# Patient Record
Sex: FEMALE | Race: WHITE
[De-identification: names, ages, dates, MRNs, and addresses within clinical notes are randomized per-mention and may not be internally consistent; named-entity substitution may affect disease eponyms.]

---

## 2017-03-22 ENCOUNTER — HOSPITAL ENCOUNTER (OUTPATIENT)
Dept: HOSPITAL 47 - RADMRIMAIN | Age: 82
Discharge: HOME | End: 2017-03-22
Payer: MEDICARE

## 2017-03-22 DIAGNOSIS — M48.06: Primary | ICD-10-CM

## 2017-03-22 DIAGNOSIS — M51.36: ICD-10-CM

## 2017-03-22 DIAGNOSIS — M46.96: ICD-10-CM

## 2017-03-22 PROCEDURE — 72148 MRI LUMBAR SPINE W/O DYE: CPT

## 2017-03-23 NOTE — MR
EXAMINATION TYPE: MR lumbar spine wo con

 

DATE OF EXAM: 3/22/2017 7:04 PM

 

COMPARISON: Prior lumbar spine MRI 10 July 2015

 

HISTORY: Low back pain, stenosis, radiculopathy

 

TECHNIQUE: 

Multiplanar, multisequence images of the lumbar spine were acquired.

 

L1-L2: Mild facet arthropathy, no significant central stenosis or foraminal encroachment. Broad-based
 disc bulge causes minimal anterior mass effect on the thecal sac

 

L2-L3: Circumferential posterior extension of endplate disc complex results in some left-sided forami
nal encroachment greater than right, there is mild anterior mass effect on the thecal sac, minimal ce
ntral stenosis. Facet arthropathy with hypertrophy of the ligamentum flavum encroaches on the lateral
 recesses

 

L3-L4: Broad-based posterior disc bulge causes mild anterior mass effect on the thecal sac. Facet art
hropathy with hypertrophy of the ligamentum flavum causes lateral recess stenosis, there is trefoil a
ppearance of the thecal sac, circumferential extension encroaches somewhat on the foramina left great
er than right.

 

L4-L5: Facet arthropathy with hypertrophy of the ligamentum flavum causes a trefoil appearance of the
 thecal sac, small central posterior disc herniation combines with the listhesis to cause moderate sp
inal stenosis similar to prior exam

 

L5-S1: Facet arthropathy with hypertrophy of ligamentum flavum encroaches upon the lateral recess gre
ater on the right, circumferential posterior disc bulge extends laterally and encroaches somewhat on 
the neural foramina greater on the right as on prior, there is mild anterior mass effect on the theca
l sac and possibly on the proximal S1 nerve roots

 

Lumbar segments are intact.  No paraspinal masses are identified.  Conus medullaris has a normal appe
arance. Anterolisthesis grade 1 L4-5 is again noted. There is multilevel spondylosis. Loss of disc he
ight and signal at the intervertebral levels with endplate discogenic marrow signal change is again s
een. There is a spinal curvature. The conus is at T12. Superior endplate L2 shows a Schmorl's node to
 the right of midline as on prior.

 

IMPRESSION:

Spinal stenosis most significant at L4-5, multilevel degenerative disc disease and facet arthropathy,
 foraminal encroachment as described. Findings are similar to prior exam.

## 2017-04-13 ENCOUNTER — HOSPITAL ENCOUNTER (OUTPATIENT)
Dept: HOSPITAL 47 - RADCTMAIN | Age: 82
Discharge: HOME | End: 2017-04-13
Payer: MEDICARE

## 2017-04-13 DIAGNOSIS — R63.4: ICD-10-CM

## 2017-04-13 DIAGNOSIS — Z98.890: ICD-10-CM

## 2017-04-13 DIAGNOSIS — R10.84: Primary | ICD-10-CM

## 2017-04-13 LAB
ANION GAP SERPL CALC-SCNC: 8 MMOL/L
BUN SERPL-SCNC: 15 MG/DL (ref 7–17)
CALCIUM SPEC-MCNC: 9.5 MG/DL (ref 8.4–10.2)
CHLORIDE SERPL-SCNC: 101 MMOL/L (ref 98–107)
CO2 SERPL-SCNC: 27 MMOL/L (ref 22–30)
GLUCOSE SERPL-MCNC: 105 MG/DL (ref 74–99)
NON-AFRICAN AMERICAN GFR(MDRD): 47
POTASSIUM SERPL-SCNC: 5.4 MMOL/L (ref 3.5–5.1)
SODIUM SERPL-SCNC: 136 MMOL/L (ref 137–145)

## 2017-04-13 PROCEDURE — 80048 BASIC METABOLIC PNL TOTAL CA: CPT

## 2017-04-13 PROCEDURE — 74177 CT ABD & PELVIS W/CONTRAST: CPT

## 2017-04-13 PROCEDURE — 36415 COLL VENOUS BLD VENIPUNCTURE: CPT

## 2017-04-13 NOTE — CT
EXAMINATION TYPE: CT abdomen pelvis w con

 

DATE OF EXAM: 4/13/2017 6:32 PM

 

COMPARISON: 3/27/2015

 

HISTORY: Patient complains of generalized abdominal pain and right sided abdominal mass.

 

CT DLP: 397.5 mGycm

Automated exposure control for dose reduction was used.

 

TECHNIQUE:  Helical acquisition of images was performed from the lung bases through the pelvis.

 

CONTRAST: 

Performed with Oral Contrast and with IV Contrast, patient injected with 100 mL of Omnipaque 300.

 

FINDINGS: 

Lung bases are clear of consolidation. There is no pleural effusion. Abdominal aorta is atheromatous.


 

There are clips from cholecystectomy. Spleen appears normal. There is no pancreatic mass. There are i
s no discrete liver defect. There is no retroperitoneal adenopathy. Kidneys show satisfactory contras
t opacification. There is no hydronephrosis. There is no adrenal mass. There is apparent aortoiliac s
tent.

 

Bladder distends smoothly. I see no evidence of a bowel obstruction. There is evidence for minimal wa
ll thickening involving the sigmoid colon. There are no dilated loops. There is some retained fecal m
aterial in the transverse colon and right colon.

The bony structures are intact. There is no compression fracture in the lumbar spine.

IMPRESSION: 

ATHEROSCLEROTIC VASCULAR DISEASE. MILD CONSTIPATION. MINIMAL SIGMOID COLON WALL THICKENING THAT IS LE
SS NOTICEABLE THAN OLD CT SCAN OF 3/27/2015. THIS COULD RELATE TO SOME PERSISTENT MILD COLITIS. 

 

THERE ARE SURGICAL CLIPS ON THE ANTERIOR ABDOMINAL WALL AND FOCAL THICKENING ON THE RIGHT SIDE IN THE
 MID ABDOMEN THAT COULD RELATE TO SOME SCAR TISSUE THAT IS PALPABLE DUE TO DECREASED SUBCUTANEOUS FAT
 COMPARED TO THE OLD EXAM.

## 2017-04-28 ENCOUNTER — HOSPITAL ENCOUNTER (OUTPATIENT)
Dept: HOSPITAL 47 - ORWHC2ENDO | Age: 82
Discharge: HOME | End: 2017-04-28
Payer: MEDICARE

## 2017-04-28 VITALS — DIASTOLIC BLOOD PRESSURE: 64 MMHG | SYSTOLIC BLOOD PRESSURE: 136 MMHG | HEART RATE: 67 BPM

## 2017-04-28 VITALS — TEMPERATURE: 98.1 F | RESPIRATION RATE: 16 BRPM

## 2017-04-28 VITALS — BODY MASS INDEX: 23.6 KG/M2

## 2017-04-28 DIAGNOSIS — K52.9: ICD-10-CM

## 2017-04-28 DIAGNOSIS — I73.9: ICD-10-CM

## 2017-04-28 DIAGNOSIS — Z87.11: ICD-10-CM

## 2017-04-28 DIAGNOSIS — R63.4: ICD-10-CM

## 2017-04-28 DIAGNOSIS — I25.10: ICD-10-CM

## 2017-04-28 DIAGNOSIS — K62.1: ICD-10-CM

## 2017-04-28 DIAGNOSIS — E78.5: ICD-10-CM

## 2017-04-28 DIAGNOSIS — Z86.73: ICD-10-CM

## 2017-04-28 DIAGNOSIS — Z88.8: ICD-10-CM

## 2017-04-28 DIAGNOSIS — I10: ICD-10-CM

## 2017-04-28 DIAGNOSIS — Z79.891: ICD-10-CM

## 2017-04-28 DIAGNOSIS — K51.20: Primary | ICD-10-CM

## 2017-04-28 DIAGNOSIS — Z87.891: ICD-10-CM

## 2017-04-28 DIAGNOSIS — Z88.1: ICD-10-CM

## 2017-04-28 DIAGNOSIS — Z95.1: ICD-10-CM

## 2017-04-28 DIAGNOSIS — K29.50: ICD-10-CM

## 2017-04-28 DIAGNOSIS — Z79.899: ICD-10-CM

## 2017-04-28 DIAGNOSIS — Z79.02: ICD-10-CM

## 2017-04-28 PROCEDURE — 45380 COLONOSCOPY AND BIOPSY: CPT

## 2017-04-28 PROCEDURE — 88305 TISSUE EXAM BY PATHOLOGIST: CPT

## 2017-04-28 PROCEDURE — 88342 IMHCHEM/IMCYTCHM 1ST ANTB: CPT

## 2017-04-28 PROCEDURE — 43239 EGD BIOPSY SINGLE/MULTIPLE: CPT

## 2017-04-28 NOTE — P.PCN
Date of Procedure: 04/28/17


Procedure(s) Performed: 


Brief history:


Patient is a pleasant 82-year-old white female, scheduled for an elective upper 

endoscopy as well as colonoscopy as a part of evaluation of abdominal pain, 

abdominal bloating and progressive weight loss of 20 pounds in the last 6 

months duration.  She does have long-standing history of ulcerative colitis 

which appears to be in clinical remission.  She is not on any maintenance 

medications for the last 2 years.





Procedure performed:


Esophagogastroduodenoscopy with biopsy


Colonoscopy with biopsy





Preoperative diagnosis:


Abdominal pain, abdominal bloating


Progressive weight loss


Long-standing history of ulcerative colitis





Anesthesia: MAC





Procedure:


After informed consent was obtained from the patient  was brought into the 

endoscopy unit and IV  sedation was administered by anesthesia under continuous 

monitoring.  Initially upper endoscopy was done.  The Olympus  video 

endoscope was inserted inserted into the mouth and esophagus intubated without 

any difficulty and was gradually advanced into the stomach and duodenum and 

carefully examined.  The bulb and second part of the duodenum appeared normal.  

The scope was then withdrawn into the stomach adequately insufflated with air 

and upon careful examination  the antrum and body, cardia and fundus appeared 

normal.  The scope was then withdrawn into the esophagus.  The GE junction was 

located at 40 cm to the incisors.  It appeared regular with no erythema 

erosions or ulcerations.  Rest of the esophagus appeared normal.  Patient 

tolerated the procedure well.





At this time the patient continued to remain sedation.  Initial digital rectal 

examination was normal.  Olympus  video colonoscope was then inserted 

into the rectum and gradually advanced to the cecum without any difficulty.  

Careful examination was performed as the scope was gradually being withdrawn.  

The prep was excellent.  The cecum, ascending colon, transverse colon, 

descending colon, appeared normal.  There was mild colitis noted in the sigmoid 

colon and rectum.  In the mid rectum there was a thick pedunculated, 3-4 cm 

polypoid lesion identified with central ulceration and multiple biopsies were 

done from this area to rule out neoplasm.   Retroflexion was performed in the 

rectum and no lesions were noted.  Patient tolerated the procedure well.





Impression:


1.  Upper endoscopy revealed mild antral gastritis.


2.  Colonoscopy revealed mild active colitis involving the rectum and sigmoid 

colon and rectal polypoid lesion measuring 3-4 cm in size with a thick 

peduncular, status post multiple biopsies to rule out neoplasm.








Recommendations:


Findings of this examination were discussed with the patient as well as her 

family.  She was advised to follow with the biopsy results.  She'll be seen in 

office in one to 2 weeks.

## 2017-07-07 ENCOUNTER — HOSPITAL ENCOUNTER (OUTPATIENT)
Dept: HOSPITAL 47 - ORWHC2ENDO | Age: 82
Discharge: HOME | End: 2017-07-07
Payer: MEDICARE

## 2017-07-07 VITALS — DIASTOLIC BLOOD PRESSURE: 71 MMHG | SYSTOLIC BLOOD PRESSURE: 166 MMHG | HEART RATE: 72 BPM

## 2017-07-07 VITALS — RESPIRATION RATE: 16 BRPM | TEMPERATURE: 97.1 F

## 2017-07-07 VITALS — BODY MASS INDEX: 23.6 KG/M2

## 2017-07-07 DIAGNOSIS — Z91.09: ICD-10-CM

## 2017-07-07 DIAGNOSIS — Z86.73: ICD-10-CM

## 2017-07-07 DIAGNOSIS — Z87.19: ICD-10-CM

## 2017-07-07 DIAGNOSIS — I10: ICD-10-CM

## 2017-07-07 DIAGNOSIS — I25.10: ICD-10-CM

## 2017-07-07 DIAGNOSIS — K51.90: ICD-10-CM

## 2017-07-07 DIAGNOSIS — Z79.01: ICD-10-CM

## 2017-07-07 DIAGNOSIS — Z79.899: ICD-10-CM

## 2017-07-07 DIAGNOSIS — Z79.891: ICD-10-CM

## 2017-07-07 DIAGNOSIS — K52.9: ICD-10-CM

## 2017-07-07 DIAGNOSIS — I48.91: ICD-10-CM

## 2017-07-07 DIAGNOSIS — K62.1: Primary | ICD-10-CM

## 2017-07-07 DIAGNOSIS — Z86.010: ICD-10-CM

## 2017-07-07 DIAGNOSIS — E78.5: ICD-10-CM

## 2017-07-07 PROCEDURE — 45338 SIGMOIDOSCOPY W/TUMR REMOVE: CPT

## 2017-07-07 PROCEDURE — 45334 SIGMOIDOSCOPY FOR BLEEDING: CPT

## 2017-07-07 PROCEDURE — 88305 TISSUE EXAM BY PATHOLOGIST: CPT

## 2017-07-07 NOTE — P.PCN
Date of Procedure: 07/07/17


Preoperative Diagnosis: 





Postoperative Diagnosis: 





Procedure(s) Performed: 


BRIEF HISTORY: Patient is a 82-year-old pleasant white female, scheduled for an 

elective flexible sigmoidoscopy as a part of evaluation of rectal bleeding for 

the last few weeks duration.  She does have long-standing history of ulcerative 

colitis diagnosed many years ago.  She recently had a colonoscopy 9 weeks ago 

and was noted to have active colitis involving the rectal sigmoid colon and a 

large 3 cm polyp in the proximal rectum which was biopsied.  The biopsies 

revealed inflammatory polyp with no evidence of dysplasia.  The patient was 

treated with steroids for 6 weeks and initially the symptoms resolve however 

after she stopped the steroids about 3 weeks ago she started having more rectal 

bleeding.  She does have history of atrial fibrillation and has been on the 

left was which has been on hold for the last 2 days.  She is scheduled for a 

flexible sigmoidoscopy for endoscopy polypectomy of the rectal polyp.





PROCEDURE PERFORMED: Flexible sigmoidoscopy with snare polypectomy and 

resolution clip placement.





PREOPERATIVE DIAGNOSIS: History of ulcerative colitis, large rectal polyp noted 

on recent colonoscopy 2 months ago





IV sedation per Anesthesia. 





PROCEDURE: After informed consent was obtained, the patient, was brought into 

the endoscopy unit. IV sedation was administered by Anesthesia under continuous 

monitoring.  Digital rectal examination was normal. Initially the Olympus CF-

160 flexible video colonoscope was then inserted in the rectum, gradually 

advanced into the splenic flexure. Careful examination was performed as the 

scope was gradually being withdrawn.  Descending colon, sigmoid colon, and 

rectum had mucosal erythema and friability consistent with active colitis.  In 

the mid rectum there was a large 3-4 cm polyp identified which was very friable 

and oozing.  Proceed with piecemeal snare polypectomy and complete polypectomy 

was accomplished.  Following the polypectomy there was brisk oozing noted at 

the base of the polyp and hence resolution clips were placed and total of 3 

clips were placed into good hemostasis was achieved.  There was active colitis 

involving the rectum also.  Retroflexion was performed in the rectum and no 

lesions were seen. The patient tolerated the procedure well. 





IMPRESSION:


Active left-sided colitis


3 cm friable polyp in the midrectum status post piecemeal snare polypectomy as 

described above with some bleeding post polypectomy, status post resolution 

clip placement with good hemostasis





RECOMMENDATIONS:  Findings of this examination were discussed with the patient 

as well as her family.  She was advised to hold off on the eliquis for 3 more 

days.  Since she does have active colitis I will start her back on prednisone 

30 mg daily and she will be advised to taper it by 5 mg every 2 weeks.  She 

will continue on Lialda 4 tablets daily.  She'll be seen in the office in 4 

weeks.


Implants: 





Indications for Procedure: 





Operative Findings: 





Description of Procedure:

## 2017-09-06 ENCOUNTER — HOSPITAL ENCOUNTER (INPATIENT)
Dept: HOSPITAL 47 - EC | Age: 82
LOS: 1 days | Discharge: HOME | DRG: 149 | End: 2017-09-07
Attending: HOSPITALIST | Admitting: HOSPITALIST
Payer: MEDICARE

## 2017-09-06 VITALS — BODY MASS INDEX: 24.5 KG/M2

## 2017-09-06 DIAGNOSIS — Z95.1: ICD-10-CM

## 2017-09-06 DIAGNOSIS — I73.9: ICD-10-CM

## 2017-09-06 DIAGNOSIS — M19.90: ICD-10-CM

## 2017-09-06 DIAGNOSIS — I69.998: ICD-10-CM

## 2017-09-06 DIAGNOSIS — I25.10: ICD-10-CM

## 2017-09-06 DIAGNOSIS — Z87.891: ICD-10-CM

## 2017-09-06 DIAGNOSIS — H53.9: ICD-10-CM

## 2017-09-06 DIAGNOSIS — Z96.612: ICD-10-CM

## 2017-09-06 DIAGNOSIS — E78.5: ICD-10-CM

## 2017-09-06 DIAGNOSIS — G93.89: ICD-10-CM

## 2017-09-06 DIAGNOSIS — Z91.09: ICD-10-CM

## 2017-09-06 DIAGNOSIS — I10: ICD-10-CM

## 2017-09-06 DIAGNOSIS — Z79.01: ICD-10-CM

## 2017-09-06 DIAGNOSIS — H91.90: ICD-10-CM

## 2017-09-06 DIAGNOSIS — Z66: ICD-10-CM

## 2017-09-06 DIAGNOSIS — Z79.899: ICD-10-CM

## 2017-09-06 DIAGNOSIS — H81.399: Primary | ICD-10-CM

## 2017-09-06 LAB
ALP SERPL-CCNC: 68 U/L (ref 38–126)
ALT SERPL-CCNC: 25 U/L (ref 9–52)
ANION GAP SERPL CALC-SCNC: 7 MMOL/L
AST SERPL-CCNC: 16 U/L (ref 14–36)
BASOPHILS # BLD AUTO: 0.1 K/UL (ref 0–0.2)
BASOPHILS NFR BLD AUTO: 1 %
BUN SERPL-SCNC: 17 MG/DL (ref 7–17)
CALCIUM SPEC-MCNC: 8.9 MG/DL (ref 8.4–10.2)
CH: 30.1
CHCM: 33.7
CHLORIDE SERPL-SCNC: 106 MMOL/L (ref 98–107)
CO2 SERPL-SCNC: 24 MMOL/L (ref 22–30)
EOSINOPHIL # BLD AUTO: 0.6 K/UL (ref 0–0.7)
EOSINOPHIL NFR BLD AUTO: 6 %
ERYTHROCYTE [DISTWIDTH] IN BLOOD BY AUTOMATED COUNT: 4.07 M/UL (ref 3.8–5.4)
ERYTHROCYTE [DISTWIDTH] IN BLOOD: 14.8 % (ref 11.5–15.5)
GLUCOSE SERPL-MCNC: 111 MG/DL (ref 74–99)
HCT VFR BLD AUTO: 36.5 % (ref 34–46)
HDW: 2.55
HGB BLD-MCNC: 11.9 GM/DL (ref 11.4–16)
INR PPP: 1.1 (ref ?–1.2)
LUC NFR BLD AUTO: 3 %
LYMPHOCYTES # SPEC AUTO: 2 K/UL (ref 1–4.8)
LYMPHOCYTES NFR SPEC AUTO: 19 %
MAGNESIUM SPEC-SCNC: 1.8 MG/DL (ref 1.6–2.3)
MCH RBC QN AUTO: 29.3 PG (ref 25–35)
MCHC RBC AUTO-ENTMCNC: 32.7 G/DL (ref 31–37)
MCV RBC AUTO: 89.7 FL (ref 80–100)
MONOCYTES # BLD AUTO: 0.6 K/UL (ref 0–1)
MONOCYTES NFR BLD AUTO: 6 %
NEUTROPHILS # BLD AUTO: 6.5 K/UL (ref 1.3–7.7)
NEUTROPHILS NFR BLD AUTO: 65 %
NON-AFRICAN AMERICAN GFR(MDRD): 60
POTASSIUM SERPL-SCNC: 4.3 MMOL/L (ref 3.5–5.1)
PROT SERPL-MCNC: 5.9 G/DL (ref 6.3–8.2)
PT BLD: 11 SEC (ref 9–12)
SODIUM SERPL-SCNC: 137 MMOL/L (ref 137–145)
WBC # BLD AUTO: 0.34 10*3/UL
WBC # BLD AUTO: 10.1 K/UL (ref 3.8–10.6)
WBC (PEROX): 10.41

## 2017-09-06 PROCEDURE — 80053 COMPREHEN METABOLIC PANEL: CPT

## 2017-09-06 PROCEDURE — 71020: CPT

## 2017-09-06 PROCEDURE — 83735 ASSAY OF MAGNESIUM: CPT

## 2017-09-06 PROCEDURE — 70450 CT HEAD/BRAIN W/O DYE: CPT

## 2017-09-06 PROCEDURE — 36415 COLL VENOUS BLD VENIPUNCTURE: CPT

## 2017-09-06 PROCEDURE — 99285 EMERGENCY DEPT VISIT HI MDM: CPT

## 2017-09-06 PROCEDURE — 85610 PROTHROMBIN TIME: CPT

## 2017-09-06 PROCEDURE — 84484 ASSAY OF TROPONIN QUANT: CPT

## 2017-09-06 PROCEDURE — 85025 COMPLETE CBC W/AUTO DIFF WBC: CPT

## 2017-09-06 PROCEDURE — 81001 URINALYSIS AUTO W/SCOPE: CPT

## 2017-09-06 PROCEDURE — 83605 ASSAY OF LACTIC ACID: CPT

## 2017-09-06 PROCEDURE — 93005 ELECTROCARDIOGRAM TRACING: CPT

## 2017-09-06 NOTE — XR
EXAM:

  XR Chest, 2 Views

 

CLINICAL HISTORY:

  Reason: Syncope

 

TECHNIQUE:

  Frontal and lateral views of the chest.

 

COMPARISON:

  3/28/2015

 

FINDINGS:

  Lungs:  Hazy prominence of the interstitial markings with mild 

peribronchial cuffing is seen, likely representing mild pulmonary 

edema/CHF.  Underlying emphysema is again suggested.

  Pleural space:  Unremarkable.  No pneumothorax.

  Heart:  Patient is again noted to be status post CABG.  The heart is 

likely at upper limits of normal.

  Mediastinum:  Unremarkable.

  Bones/joints:  Patient status post left total shoulder arthroplasty.  

Unchanged right shoulder.

  Vasculature:  Unchanged atherosclerotic )vascular calcifications 

involving the visualized aorta.  

  Tubes, lines and devices:  Monitor leads overlie the chest limiting 

evaluation.

 

IMPRESSION:     

 Patient status post CABG.  The heart appears to be at the upper limits 

of normal.  Hazy prominence of the interstitial markings with mild 

peribronchial cuffing, likely representing mild pulmonary edema/CHF, not 

seen on prior study.

## 2017-09-06 NOTE — ED
General Adult HPI





- General


Chief complaint: Dizziness


Stated complaint: off balance/nausea/throat problem


Time Seen by Provider: 09/06/17 22:16


Source: patient, family, RN notes reviewed


Mode of arrival: wheelchair


Limitations: no limitations





- History of Present Illness


Initial comments: 





82-year-old female presents for evaluation of lightheadedness, dizziness, and 

nausea.  Patient was in bed, she did move her head and began feeling very dizzy 

with blurred vision.  She has a sensation of objects were moving.  She had no 

vomiting but significant nausea.  Patient has past medical history of CVA.  

According to her daughter she has had some symptoms of lightheadedness for 

several months.  Patient does report a left-sided chest pain which was lateral 

sharp in nature, worse with movement.  Denies any central chest pressure.  

Denies fever or chills.  She has had some nasal congestion, and bilateral 

fullness in her ears.  Denies any hearing changes.  Denies any loss of vision.  

Denies focal weakness.





- Related Data


 Home Medications











 Medication  Instructions  Recorded  Confirmed


 


Atorvastatin [Lipitor] 20 mg PO HS 01/22/15 09/06/17


 


Apixaban [Eliquis] 2.5 mg PO BID 04/26/17 09/06/17


 


Losartan Potassium [Cozaar] 100 mg PO DAILY 09/06/17 09/06/17











 Allergies











Allergy/AdvReac Type Severity Reaction Status Date / Time


 


ciprofloxacin HCl Allergy Mild Itching Verified 09/06/17 22:43





[From Cipro]     


 


adhesive AdvReac Mild Itching Verified 09/06/17 22:43














Review of Systems


ROS Statement: 


Those systems with pertinent positive or pertinent negative responses have been 

documented in the HPI.





ROS Other: All systems not noted in ROS Statement are negative.





Past Medical History


Past Medical History: Coronary Artery Disease (CAD), CVA/TIA, GI Bleed, Hearing 

Disorder / Deafness, Hyperlipidemia, Hypertension, Musculoskeletal Disorder, 

Osteoarthritis (OA)


Additional Past Medical History / Comment(s): PVD.  PEPTIC ULCER DISEASE.  

ULCERATIVE COLITIS.  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 

VISION LT EYE, OCC VISION CAITY & HEARING STROKE.


History of Any Multi-Drug Resistant Organisms: None Reported


Past Surgical History: Appendectomy, Coronary Bypass/CABG, Hysterectomy, Joint 

Replacement, Tonsillectomy


Additional Past Surgical History / Comment(s): Carotid Artery on Left.  

BILATERAL STENTS FOR PVD.  QUAD CABG 1996.  COLONOSCOPY, EGD.  LT SHOULDER 

REPLACEMENT. polyp removed from rectum


Past Anesthesia/Blood Transfusion Reactions: No Reported Reaction


Past Psychological History: No Psychological Hx Reported


Smoking Status: Former smoker


Past Alcohol Use History: None Reported


Past Drug Use History: None Reported





- Past Family History


  ** Sister(s)


Family Medical History: Cancer





  ** Father


Family Medical History: No Reported History


Additional Family Medical History / Comment(s): .





General Exam


Limitations: no limitations


General appearance: alert, in no apparent distress


Head exam: Present: atraumatic, normocephalic


Eye exam: Present: normal appearance, PERRL, EOMI


ENT exam: Present: mucous membranes moist, TM's normal bilaterally


Neck exam: Present: normal inspection.  Absent: tenderness, meningismus


Respiratory exam: Present: normal lung sounds bilaterally.  Absent: respiratory 

distress


Cardiovascular Exam: Present: regular rate, irregular rhythm


GI/Abdominal exam: Present: soft.  Absent: distended, tenderness


Extremities exam: Present: normal inspection, normal capillary refill.  Absent: 

pedal edema


Neurological exam: Present: alert, oriented X3, CN II-XII intact, motor sensory 

deficit, other (Left finger-to-nose ataxia)


Psychiatric exam: Present: normal affect, normal mood


Skin exam: Present: warm, dry.  Absent: cyanosis, diaphoretic





Course


 Vital Signs











  09/06/17 09/06/17 09/07/17





  22:10 23:10 00:19


 


Temperature 97.6 F  


 


Pulse Rate 86 71 76


 


Respiratory 18 16 17





Rate   


 


Blood Pressure 155/66 141/59 151/75


 


O2 Sat by Pulse 98 99 97





Oximetry   














EKG Findings





- EKG Comments:


EKG Findings:: EKG shows sinus rhythm with PVCs and PACs, ventricular rate 81, 

HI interval 124, QRS duration 88, , no signs of ischemia





Medical Decision Making





- Medical Decision Making








81 yo female presenting with chief complaint of vertigo and lightheadedness as 

well as nausea and vomiting.  Patient's had these symptoms for several months.  

She does report some symptoms suggesting peripheral vertigo including ear 

fullness and some nasal congestion, however on examination she has finger-nose 

ataxia in the left upper extremity.  She also has had symptoms for some time.  

This is concerning for central vertigo.  She does have a history of previous CVA

, CT does show encephalomalacia right occipital lobe.  There is no intracranial 

hemorrhage or new acute infarction.  Chest x-ray shows very mild pulmonary 

vascular congestion.  Laboratory studies reveal normal white blood cell count 

and pulled, stable hemoglobin 11.9, initial troponin is 0.0-1 which is normal.  

This level will be repeated once to ensure stability.  All of the laboratory 

studies are unremarkable.  EKG shows no ST segment elevation or depression, 

there is PVCs and PACs.  Vital signs are stable on the emergency department.  

Patient is given an aspirin and will be admitted for neurology evaluation.











Diagnosis: Concern for central vertigo





- Lab Data


Result diagrams: 


 09/06/17 22:29





 09/06/17 22:29


 Lab Results











  09/06/17 09/06/17 09/06/17 Range/Units





  22:29 22:29 22:29 


 


WBC  10.1    (3.8-10.6)  k/uL


 


RBC  4.07    (3.80-5.40)  m/uL


 


Hgb  11.9    (11.4-16.0)  gm/dL


 


Hct  36.5    (34.0-46.0)  %


 


MCV  89.7    (80.0-100.0)  fL


 


MCH  29.3    (25.0-35.0)  pg


 


MCHC  32.7    (31.0-37.0)  g/dL


 


RDW  14.8    (11.5-15.5)  %


 


Plt Count  222    (150-450)  k/uL


 


Neutrophils %  65    %


 


Lymphocytes %  19    %


 


Monocytes %  6    %


 


Eosinophils %  6    %


 


Basophils %  1    %


 


Neutrophils #  6.5    (1.3-7.7)  k/uL


 


Lymphocytes #  2.0    (1.0-4.8)  k/uL


 


Monocytes #  0.6    (0-1.0)  k/uL


 


Eosinophils #  0.6    (0-0.7)  k/uL


 


Basophils #  0.1    (0-0.2)  k/uL


 


PT     (9.0-12.0)  sec


 


INR     (<1.2)  


 


Sodium   137   (137-145)  mmol/L


 


Potassium   4.3   (3.5-5.1)  mmol/L


 


Chloride   106   ()  mmol/L


 


Carbon Dioxide   24   (22-30)  mmol/L


 


Anion Gap   7   mmol/L


 


BUN   17   (7-17)  mg/dL


 


Creatinine   0.90   (0.52-1.04)  mg/dL


 


Est GFR (MDRD) Af Amer   >60   (>60 ml/min/1.73 sqM)  


 


Est GFR (MDRD) Non-Af   60   (>60 ml/min/1.73 sqM)  


 


Glucose   111 H   (74-99)  mg/dL


 


Plasma Lactic Acid Bi    0.9  (0.7-2.0)  mmol/L


 


Calcium   8.9   (8.4-10.2)  mg/dL


 


Magnesium   1.8   (1.6-2.3)  mg/dL


 


Total Bilirubin   0.3   (0.2-1.3)  mg/dL


 


AST   16   (14-36)  U/L


 


ALT   25   (9-52)  U/L


 


Alkaline Phosphatase   68   ()  U/L


 


Troponin I     (0.000-0.034)  ng/mL


 


Total Protein   5.9 L   (6.3-8.2)  g/dL


 


Albumin   3.2 L   (3.5-5.0)  g/dL


 


Urine Color     


 


Urine Appearance     (Clear)  


 


Urine pH     (5.0-8.0)  


 


Ur Specific Gravity     (1.001-1.035)  


 


Urine Protein     (Negative)  


 


Urine Glucose (UA)     (Negative)  


 


Urine Ketones     (Negative)  


 


Urine Blood     (Negative)  


 


Urine Nitrite     (Negative)  


 


Urine Bilirubin     (Negative)  


 


Urine Urobilinogen     (<2.0)  mg/dL


 


Ur Leukocyte Esterase     (Negative)  


 


Urine RBC     (0-5)  /hpf


 


Urine WBC     (0-5)  /hpf


 


Urine Bacteria     (None)  /hpf


 


Urine Mucus     (None)  /hpf














  09/06/17 09/06/17 09/06/17 Range/Units





  22:29 22:29 23:55 


 


WBC     (3.8-10.6)  k/uL


 


RBC     (3.80-5.40)  m/uL


 


Hgb     (11.4-16.0)  gm/dL


 


Hct     (34.0-46.0)  %


 


MCV     (80.0-100.0)  fL


 


MCH     (25.0-35.0)  pg


 


MCHC     (31.0-37.0)  g/dL


 


RDW     (11.5-15.5)  %


 


Plt Count     (150-450)  k/uL


 


Neutrophils %     %


 


Lymphocytes %     %


 


Monocytes %     %


 


Eosinophils %     %


 


Basophils %     %


 


Neutrophils #     (1.3-7.7)  k/uL


 


Lymphocytes #     (1.0-4.8)  k/uL


 


Monocytes #     (0-1.0)  k/uL


 


Eosinophils #     (0-0.7)  k/uL


 


Basophils #     (0-0.2)  k/uL


 


PT  11.0    (9.0-12.0)  sec


 


INR  1.1    (<1.2)  


 


Sodium     (137-145)  mmol/L


 


Potassium     (3.5-5.1)  mmol/L


 


Chloride     ()  mmol/L


 


Carbon Dioxide     (22-30)  mmol/L


 


Anion Gap     mmol/L


 


BUN     (7-17)  mg/dL


 


Creatinine     (0.52-1.04)  mg/dL


 


Est GFR (MDRD) Af Amer     (>60 ml/min/1.73 sqM)  


 


Est GFR (MDRD) Non-Af     (>60 ml/min/1.73 sqM)  


 


Glucose     (74-99)  mg/dL


 


Plasma Lactic Acid Bi     (0.7-2.0)  mmol/L


 


Calcium     (8.4-10.2)  mg/dL


 


Magnesium     (1.6-2.3)  mg/dL


 


Total Bilirubin     (0.2-1.3)  mg/dL


 


AST     (14-36)  U/L


 


ALT     (9-52)  U/L


 


Alkaline Phosphatase     ()  U/L


 


Troponin I   0.021   (0.000-0.034)  ng/mL


 


Total Protein     (6.3-8.2)  g/dL


 


Albumin     (3.5-5.0)  g/dL


 


Urine Color    Yellow  


 


Urine Appearance    Clear  (Clear)  


 


Urine pH    5.0  (5.0-8.0)  


 


Ur Specific Gravity    1.009  (1.001-1.035)  


 


Urine Protein    Negative  (Negative)  


 


Urine Glucose (UA)    Negative  (Negative)  


 


Urine Ketones    Negative  (Negative)  


 


Urine Blood    Negative  (Negative)  


 


Urine Nitrite    Negative  (Negative)  


 


Urine Bilirubin    Negative  (Negative)  


 


Urine Urobilinogen    <2.0  (<2.0)  mg/dL


 


Ur Leukocyte Esterase    Small H  (Negative)  


 


Urine RBC    <1  (0-5)  /hpf


 


Urine WBC    3  (0-5)  /hpf


 


Urine Bacteria    Rare H  (None)  /hpf


 


Urine Mucus    Rare H  (None)  /hpf














Disposition


Clinical Impression: 


 Vertigo





Disposition: ADMITTED AS IP TO THIS [facility]


Condition: Stable


Referrals: 


Javier Mancuso MD [Primary Care Provider] - 1-2 days


Decision to Admit Reason: Admit from EC


Decision Date: 09/07/17


Decision Time: 00:36

## 2017-09-06 NOTE — CT
EXAM:

  CT Head Without Intravenous Contrast

 

CLINICAL HISTORY:

  Syncope, weakness

 

TECHNIQUE:

  Axial computed tomography images of the head/brain without intravenous 

contrast.  DLP is 1036.00 mGy-cm.  This CT exam was performed using one 

or more of the following dose reduction techniques: automated exposure 

control, adjustment of the mA and/or kV according to patient size, and/or 

use of iterative reconstruction technique.

 

COMPARISON:

  1/22/2015.

 

FINDINGS:

  Brain:  New encephalomalacic changes are seen involving the right 

occipital lobe, likely from prior infarct.  No evidence of an acute 

transcortical infarct or acute intracranial hemorrhage.  Patchy and 

confluent areas of decreased attenuation are seen in the bilateral 

periventricular and subcortical white matter, essentially unchanged, 

likely representing moderate/severe microangiopathy.  Age appropriate 

senescent changes seen.  No edema.

  Ventricles:  Unremarkable.  No pathologic ventriculomegaly.

  Bones/joints:  Unremarkable.  No acute fracture.

  Soft tissues:  Unremarkable.

  Vasculature:  The carotid siphons are calcified.

  Sinuses:  Mild mucosal thickening of the ethmoid air cells is suspected,

 similar to prior study, which may represent sinus disease.

  Mastoid air cells:  Unremarkable as visualized.  No mastoid effusion.

 

IMPRESSION:     

New encephalomalacic changes involving the right occipital lobe, likely 

from prior infarct.  Essentially unchanged moderate/severe 

microangiopathy.  No definitive evidence of acute transcortical infarct.  

No evidence of acute intracranial hemorrhage.  If there is further 

clinical concern, consider short-term interval CT of the head versus MRI 

of the brain for follow-up.

 

Mild ethmoid sinusitis likely present, similar to prior study.

## 2017-09-07 VITALS — SYSTOLIC BLOOD PRESSURE: 166 MMHG | HEART RATE: 86 BPM | DIASTOLIC BLOOD PRESSURE: 69 MMHG | TEMPERATURE: 97.2 F

## 2017-09-07 VITALS — RESPIRATION RATE: 18 BRPM

## 2017-09-07 LAB
PARTICLE COUNT: 4796
PH UR: 5 [PH] (ref 5–8)
RBC UR QL: <1 /HPF (ref 0–5)
SP GR UR: 1.01 (ref 1–1.03)
UA BILLING (MACRO VS. MICRO): (no result)
UROBILINOGEN UR QL STRIP: <2 MG/DL (ref ?–2)
WBC #/AREA URNS HPF: 3 /HPF (ref 0–5)

## 2017-09-07 NOTE — P.HPIM
History of Present Illness


H&P Date: 09/07/17 (Discharge summary as well)


Chief Complaint: Dizziness


82-year-old female presents to the emergency room with complaints of an 

isolated incident of the dizziness which was noted as patient was finally 

readjust herself in bed last night.  Patient noted this vague episode of the 

room spinning around her self and feeling of her head getting bigger.  Patient 

however rested back and was able to be comfortable and lost her symptoms.  

Patient continued to have recurrent symptoms thereafter with any change in head 

positions





Patient was able to ambulate in the hospital without much difficulty.





Today patient states that she is back to her baseline the symptoms lasted less 

than a few minutes.  States that she continues to have these symptoms with any 

change in the position however are significantly milder





Patient has a history of a CVA in the past with some residual symptoms of 

facial disorientation and difficulty with the finding words.  He shouldn't 

apparently also had some vision loss at the time of her CVA





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 

been on Eliquis since then





Patient's daughter was at the bedside states that she thinks she is scan 

progressively worse over the last 7-8 months in regards to her higher function 

including making decisions spatial orientation depth perception.





A computed tomography scan in the emergency room was noted to have 

encephalomalacia.  However no acute infarct was noted there is some concern for 

vascular disease





At this time patient denies having any headaches change in vision from her 

baseline, nausea chest pain vomiting diarrhea











Review of systems 14 point review of systems was done nonpertinent then all as 

mentioned above











Physical exam Gen. appearance oriented 3 in no distress





Neck is supple no JVD





Lungs good air entry clear to auscultation no rhonchi or wheezing





Heart S1-S2 heard regular rate and rhythm no murmurs appreciated 





Abdomen is soft nontender no organomegaly bowel sounds are intact





Neurologically cranial nerves II-12 grossly intact no focal motor or sensory 

deficits noted


Is able to repeat 3 words after 5 minutes is not able to perform serial 7





Strength is 5 out of 5 in all 4 extremities





Skin no abnormalities appreciated














Assessment and plan #1 peripheral vertigo





#2 history of a CVA





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





#4 dyslipidemia





#5 hypertension with a pressure slightly elevated however patient is also 

slightly agitated and is anxious to go home





#6 history of a GI bleed





Plan





Patient's symptoms at this time appeared to be secondary to vertigo is able to 

ambulate without much difficulty no focal deficits are noted does not appear to 

have central vertigo patient has been investigated in the past and was 

monitored in the hospital close to 20 hours





Discussed in regards to her progressive worsening of her higher function 1 

differential could be vascular dementia due to the rapidity of her symptoms





This would be deferred to an outpatient follow-up with her neurologist.  We'll 

defer use of an antiplatelet agent as well to that time patient is only on 

Eliquis this is likely secondary to the previous history of a suspected GI bleed





We'll be discharged home in stable condition CODE STATUS during the admission 

was DO NOT RESUSCITATE








Past Medical History


Past Medical History: Coronary Artery Disease (CAD), CVA/TIA, GI Bleed, Hearing 

Disorder / Deafness, Hyperlipidemia, Hypertension, Musculoskeletal Disorder, 

Osteoarthritis (OA)


Additional Past Medical History / Comment(s): PVD.  PEPTIC ULCER DISEASE.  

ULCERATIVE COLITIS.  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 

VISION LT EYE, OCC VISION CAITY & HEARING STROKE.


History of Any Multi-Drug Resistant Organisms: None Reported


Past Surgical History: Appendectomy, Coronary Bypass/CABG, Hysterectomy, Joint 

Replacement, Tonsillectomy


Additional Past Surgical History / Comment(s): Carotid Artery on Left.  

BILATERAL STENTS FOR PVD.  QUAD CABG 1996.  COLONOSCOPY, EGD.  LT SHOULDER 

REPLACEMENT. polyp removed from rectum


Past Anesthesia/Blood Transfusion Reactions: No Reported Reaction


Past Psychological History: No Psychological Hx Reported


Smoking Status: Former smoker


Past Alcohol Use History: None Reported


Additional Past Alcohol Use History / Comment(s): SMOKED FOR 40 YEARS., QUIT IN 

1992.  STATES 1 PACK COULD LAST 1 MONTH.


Past Drug Use History: None Reported





- Past Family History


  ** Sister(s)


Family Medical History: Cancer





  ** Father


Family Medical History: No Reported History


Additional Family Medical History / Comment(s): .





Medications and Allergies


 Home Medications











 Medication  Instructions  Recorded  Confirmed  Type


 


Atorvastatin [Lipitor] 20 mg PO HS 01/22/15 09/06/17 History


 


Apixaban [Eliquis] 2.5 mg PO BID 04/26/17 09/06/17 History


 


Losartan Potassium [Cozaar] 100 mg PO DAILY 09/06/17 09/06/17 History











 Allergies











Allergy/AdvReac Type Severity Reaction Status Date / Time


 


ciprofloxacin HCl Allergy Mild Itching Verified 09/06/17 22:43





[From Cipro]     


 


adhesive AdvReac Mild Itching Verified 09/06/17 22:43














Physical Exam


Vitals: 


 Vital Signs











  Temp Pulse Pulse Resp BP BP Pulse Ox


 


 09/07/17 12:00  97.2 F L   86  18   166/69  96


 


 09/07/17 08:00  96 F L   76  18   149/67  95


 


 09/07/17 03:04     18   


 


 09/07/17 01:40  96.8 F L   79  18   167/67  95


 


 09/07/17 01:16  98.4 F  69   18  156/67   97


 


 09/07/17 00:19   76   17  151/75   97


 


 09/06/17 23:10   71   16  141/59   99


 


 09/06/17 22:10  97.6 F  86   18  155/66   98








 Intake and Output











 09/07/17 09/07/17 09/07/17





 06:59 14:59 22:59


 


Intake Total 100  


 


Balance 100  


 


Intake:   


 


  Intake, IV Titration 100  





  Amount   


 


    Sodium Chloride 0.9% 1, 100  





    000 ml @ 20 mls/hr IV .   





    Q24H [facility] Rx#:[id number]   


 


Other:   


 


  # Voids 1  2


 


  Weight 63 kg  














Results


CBC & Chem 7: 


 09/06/17 22:29





 09/06/17 22:29


Labs: 


 Abnormal Lab Results - Last 24 Hours (Table)











  09/06/17 09/06/17 Range/Units





  22:29 23:55 


 


Glucose  111 H   (74-99)  mg/dL


 


Total Protein  5.9 L   (6.3-8.2)  g/dL


 


Albumin  3.2 L   (3.5-5.0)  g/dL


 


Ur Leukocyte Esterase   Small H  (Negative)  


 


Urine Bacteria   Rare H  (None)  /hpf


 


Urine Mucus   Rare H  (None)  /hpf














Thrombosis Risk Factor Assmnt





- Choose All That Apply


Any of the Below Risk Factors Present?: Yes


Each Factor Represents 1 point: Swollen legs (current)


Each Risk Factor Represents 3 Points: Age 75 years or older


Each Risk Factor Represents 5 Points: Stroke (< 1 month)


Thrombosis Risk Factor Assessment Total Risk Factor Score: 9


Thrombosis Risk Factor Assessment Level: High Risk

## 2017-11-08 ENCOUNTER — HOSPITAL ENCOUNTER (OUTPATIENT)
Dept: HOSPITAL 47 - RADMRIMAIN | Age: 82
Discharge: HOME | End: 2017-11-08
Payer: MEDICARE

## 2017-11-08 DIAGNOSIS — H81.13: ICD-10-CM

## 2017-11-08 DIAGNOSIS — I67.82: Primary | ICD-10-CM

## 2017-11-08 DIAGNOSIS — G31.9: ICD-10-CM

## 2017-11-08 DIAGNOSIS — Z88.1: ICD-10-CM

## 2017-11-08 DIAGNOSIS — Z91.040: ICD-10-CM

## 2017-11-08 PROCEDURE — 70551 MRI BRAIN STEM W/O DYE: CPT

## 2017-11-08 NOTE — MR
EXAMINATION TYPE: MR brain wo con

 

DATE OF EXAM: 11/8/2017 1:38 PM

 

COMPARISON: 1/23/2015

 

HISTORY: VERTIGO, OROSCO

 

FINDINGS:

 

The ventricles, basal cisterns and sulci overlying the cerebral convexities are moderately enlarged. 
 

 

There is evidence of moderate confluent periventricular white matter ischemic demyelination.  Remote 
occipital insults.

 

Remote deep white matter insults are also noted.  

 

No acute edema is seen on diffusion weighted  imaging.   

 

There is no evidence for midline shift or mass effect.  

 

Acute intracranial hemorrhage or extra-axial collection is not evident.    

 

The paranasal sinuses and mastoid air cells are well-aerated. Mucoperiosteal thickening of the ethmoi
d air cells.

 

IMPRESSION:    

 

Age-related atrophic and chronic small vessel ischemic change.  No acute intracranial process at this
 time.

## 2020-02-11 ENCOUNTER — HOSPITAL ENCOUNTER (INPATIENT)
Dept: HOSPITAL 47 - EC | Age: 85
LOS: 2 days | Discharge: HOME | DRG: 281 | End: 2020-02-13
Attending: INTERNAL MEDICINE | Admitting: INTERNAL MEDICINE
Payer: MEDICARE

## 2020-02-11 VITALS — BODY MASS INDEX: 27.6 KG/M2

## 2020-02-11 DIAGNOSIS — K51.90: ICD-10-CM

## 2020-02-11 DIAGNOSIS — E78.5: ICD-10-CM

## 2020-02-11 DIAGNOSIS — Z86.73: ICD-10-CM

## 2020-02-11 DIAGNOSIS — I49.3: ICD-10-CM

## 2020-02-11 DIAGNOSIS — E87.1: ICD-10-CM

## 2020-02-11 DIAGNOSIS — H91.90: ICD-10-CM

## 2020-02-11 DIAGNOSIS — I25.10: ICD-10-CM

## 2020-02-11 DIAGNOSIS — Z79.01: ICD-10-CM

## 2020-02-11 DIAGNOSIS — N39.0: ICD-10-CM
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
## 2020-02-11 DIAGNOSIS — I10: ICD-10-CM

## 2020-02-11 DIAGNOSIS — Z90.710: ICD-10-CM

## 2020-02-11 DIAGNOSIS — M19.90: ICD-10-CM

## 2020-02-11 DIAGNOSIS — Z95.828: ICD-10-CM

## 2020-02-11 DIAGNOSIS — Z86.010: ICD-10-CM

## 2020-02-11 DIAGNOSIS — Z79.899: ICD-10-CM

## 2020-02-11 DIAGNOSIS — Z87.440: ICD-10-CM

## 2020-02-11 DIAGNOSIS — Z87.891: ICD-10-CM

## 2020-02-11 DIAGNOSIS — Z87.11: ICD-10-CM

## 2020-02-11 DIAGNOSIS — E86.1: ICD-10-CM

## 2020-02-11 DIAGNOSIS — I73.9: ICD-10-CM

## 2020-02-11 DIAGNOSIS — Z95.1: ICD-10-CM

## 2020-02-11 DIAGNOSIS — I21.A1: ICD-10-CM

## 2020-02-11 DIAGNOSIS — I95.1: Primary | ICD-10-CM

## 2020-02-11 DIAGNOSIS — Z96.612: ICD-10-CM

## 2020-02-11 LAB
ALBUMIN SERPL-MCNC: 2.9 G/DL (ref 3.5–5)
ALP SERPL-CCNC: 57 U/L (ref 38–126)
ALT SERPL-CCNC: 15 U/L (ref 4–34)
ANION GAP SERPL CALC-SCNC: 10 MMOL/L
APTT BLD: 29.6 SEC (ref 22–30)
AST SERPL-CCNC: 39 U/L (ref 14–36)
BASOPHILS # BLD AUTO: 0.2 K/UL (ref 0–0.2)
BASOPHILS NFR BLD AUTO: 3 %
BUN SERPL-SCNC: 18 MG/DL (ref 7–17)
CALCIUM SPEC-MCNC: 8 MG/DL (ref 8.4–10.2)
CHLORIDE SERPL-SCNC: 104 MMOL/L (ref 98–107)
CO2 SERPL-SCNC: 19 MMOL/L (ref 22–30)
EOSINOPHIL # BLD AUTO: 0 K/UL (ref 0–0.7)
EOSINOPHIL NFR BLD AUTO: 0 %
ERYTHROCYTE [DISTWIDTH] IN BLOOD BY AUTOMATED COUNT: 4.64 M/UL (ref 3.8–5.4)
ERYTHROCYTE [DISTWIDTH] IN BLOOD: 13.1 % (ref 11.5–15.5)
GLUCOSE SERPL-MCNC: 112 MG/DL (ref 74–99)
HCT VFR BLD AUTO: 41.5 % (ref 34–46)
HGB BLD-MCNC: 13.2 GM/DL (ref 11.4–16)
INR PPP: 1 (ref ?–1.2)
LYMPHOCYTES # SPEC AUTO: 0.6 K/UL (ref 1–4.8)
LYMPHOCYTES NFR SPEC AUTO: 7 %
MAGNESIUM SPEC-SCNC: 1.8 MG/DL (ref 1.6–2.3)
MCH RBC QN AUTO: 28.6 PG (ref 25–35)
MCHC RBC AUTO-ENTMCNC: 31.9 G/DL (ref 31–37)
MCV RBC AUTO: 89.5 FL (ref 80–100)
MONOCYTES # BLD AUTO: 0.4 K/UL (ref 0–1)
MONOCYTES NFR BLD AUTO: 5 %
NEUTROPHILS # BLD AUTO: 6.4 K/UL (ref 1.3–7.7)
NEUTROPHILS NFR BLD AUTO: 82 %
PH UR: 5.5 [PH] (ref 5–8)
PLATELET # BLD AUTO: 152 K/UL (ref 150–450)
POTASSIUM SERPL-SCNC: 3.7 MMOL/L (ref 3.5–5.1)
PROT SERPL-MCNC: 5.6 G/DL (ref 6.3–8.2)
PROT UR QL: (no result)
PT BLD: 10.3 SEC (ref 9–12)
RBC UR QL: 20 /HPF (ref 0–5)
SODIUM SERPL-SCNC: 133 MMOL/L (ref 137–145)
SP GR UR: 1.03 (ref 1–1.03)
SQUAMOUS UR QL AUTO: <1 /HPF (ref 0–4)
UROBILINOGEN UR QL STRIP: <2 MG/DL (ref ?–2)
WBC # BLD AUTO: 7.7 K/UL (ref 3.8–10.6)
WBC # UR AUTO: 46 /HPF (ref 0–5)

## 2020-02-11 PROCEDURE — 85379 FIBRIN DEGRADATION QUANT: CPT

## 2020-02-11 PROCEDURE — 83605 ASSAY OF LACTIC ACID: CPT

## 2020-02-11 PROCEDURE — 99285 EMERGENCY DEPT VISIT HI MDM: CPT

## 2020-02-11 PROCEDURE — 87040 BLOOD CULTURE FOR BACTERIA: CPT

## 2020-02-11 PROCEDURE — 71046 X-RAY EXAM CHEST 2 VIEWS: CPT

## 2020-02-11 PROCEDURE — 96365 THER/PROPH/DIAG IV INF INIT: CPT

## 2020-02-11 PROCEDURE — 85025 COMPLETE CBC W/AUTO DIFF WBC: CPT

## 2020-02-11 PROCEDURE — 84484 ASSAY OF TROPONIN QUANT: CPT

## 2020-02-11 PROCEDURE — 83880 ASSAY OF NATRIURETIC PEPTIDE: CPT

## 2020-02-11 PROCEDURE — 96375 TX/PRO/DX INJ NEW DRUG ADDON: CPT

## 2020-02-11 PROCEDURE — 87077 CULTURE AEROBIC IDENTIFY: CPT

## 2020-02-11 PROCEDURE — 80053 COMPREHEN METABOLIC PANEL: CPT

## 2020-02-11 PROCEDURE — 96376 TX/PRO/DX INJ SAME DRUG ADON: CPT

## 2020-02-11 PROCEDURE — 94760 N-INVAS EAR/PLS OXIMETRY 1: CPT

## 2020-02-11 PROCEDURE — 72125 CT NECK SPINE W/O DYE: CPT

## 2020-02-11 PROCEDURE — 87324 CLOSTRIDIUM AG IA: CPT

## 2020-02-11 PROCEDURE — 94640 AIRWAY INHALATION TREATMENT: CPT

## 2020-02-11 PROCEDURE — 85730 THROMBOPLASTIN TIME PARTIAL: CPT

## 2020-02-11 PROCEDURE — 93306 TTE W/DOPPLER COMPLETE: CPT

## 2020-02-11 PROCEDURE — 80048 BASIC METABOLIC PNL TOTAL CA: CPT

## 2020-02-11 PROCEDURE — 70450 CT HEAD/BRAIN W/O DYE: CPT

## 2020-02-11 PROCEDURE — 93005 ELECTROCARDIOGRAM TRACING: CPT

## 2020-02-11 PROCEDURE — 81001 URINALYSIS AUTO W/SCOPE: CPT

## 2020-02-11 PROCEDURE — 87186 SC STD MICRODIL/AGAR DIL: CPT

## 2020-02-11 PROCEDURE — 96361 HYDRATE IV INFUSION ADD-ON: CPT

## 2020-02-11 PROCEDURE — 83735 ASSAY OF MAGNESIUM: CPT

## 2020-02-11 PROCEDURE — 36415 COLL VENOUS BLD VENIPUNCTURE: CPT

## 2020-02-11 PROCEDURE — 85610 PROTHROMBIN TIME: CPT

## 2020-02-11 PROCEDURE — 80061 LIPID PANEL: CPT

## 2020-02-11 PROCEDURE — 96366 THER/PROPH/DIAG IV INF ADDON: CPT

## 2020-02-11 PROCEDURE — 87086 URINE CULTURE/COLONY COUNT: CPT

## 2020-02-11 RX ADMIN — IPRATROPIUM BROMIDE AND ALBUTEROL SULFATE SCH: .5; 3 SOLUTION RESPIRATORY (INHALATION) at 14:20

## 2020-02-11 RX ADMIN — ATORVASTATIN CALCIUM SCH MG: 20 TABLET, FILM COATED ORAL at 21:19

## 2020-02-11 RX ADMIN — HEPARIN SODIUM SCH MLS/HR: 10000 INJECTION, SOLUTION INTRAVENOUS at 10:13

## 2020-02-11 RX ADMIN — IPRATROPIUM BROMIDE AND ALBUTEROL SULFATE SCH ML: .5; 3 SOLUTION RESPIRATORY (INHALATION) at 20:31

## 2020-02-11 RX ADMIN — IPRATROPIUM BROMIDE AND ALBUTEROL SULFATE SCH: .5; 3 SOLUTION RESPIRATORY (INHALATION) at 17:38

## 2020-02-11 NOTE — ED
Dizziness HPI





- General


Chief Complaint: Dizziness


Stated Complaint: Dizziness, Fall


Time Seen by Provider: 02/11/20 06:03


Source: patient, EMS, RN notes reviewed, old records reviewed


Mode of arrival: EMS


Limitations: no limitations





- History of Present Illness


Initial Comments: 


Patient is an 85-year-old female presents emergency Department after an episode 

of dizziness and fall.  Patient is complaining of headache, questionable loss of

consciousness.  She is on blood thinner, L Loyda.   Patient reports that she is 

now having some pain between her shoulder blades and back, and reports that 

she's had a productive cough for the past week.  Patient states that she was not

sitting on the ground for long.   She also complains of some dysuria.








- Related Data


                                Home Medications











 Medication  Instructions  Recorded  Confirmed


 


Atorvastatin [Lipitor] 20 mg PO HS 01/22/15 09/06/17


 


Apixaban [Eliquis] 2.5 mg PO BID 04/26/17 09/06/17


 


Losartan Potassium [Cozaar] 100 mg PO DAILY 09/06/17 09/06/17











                                    Allergies











Allergy/AdvReac Type Severity Reaction Status Date / Time


 


ciprofloxacin HCl Allergy Mild Itching Verified 09/06/17 22:43





[From Cipro]     


 


adhesive AdvReac Mild Itching Verified 09/06/17 22:43














Review of Systems


ROS Statement: 


Those systems with pertinent positive or pertinent negative responses have been 

documented in the HPI.





ROS Other: All systems not noted in ROS Statement are negative.





Past Medical History


Past Medical History: Coronary Artery Disease (CAD), CVA/TIA, GI Bleed, Hearing 

Disorder / Deafness, Hyperlipidemia, Hypertension, Musculoskeletal Disorder, 

Osteoarthritis (OA)


Additional Past Medical History / Comment(s): PVD.  PEPTIC ULCER DISEASE.  

ULCERATIVE COLITIS.  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 

VISION LT EYE, OCC VISION CAITY & HEARING STROKE.


History of Any Multi-Drug Resistant Organisms: None Reported


Past Surgical History: Appendectomy, Coronary Bypass/CABG, Hysterectomy, Joint 

Replacement, Tonsillectomy


Additional Past Surgical History / Comment(s): Carotid Artery on Left.  

BILATERAL STENTS FOR PVD.  QUAD CABG 1996.  COLONOSCOPY, EGD.  LT SHOULDER 

REPLACEMENT. polyp removed from rectum


Past Anesthesia/Blood Transfusion Reactions: No Reported Reaction


Past Psychological History: No Psychological Hx Reported


Smoking Status: Former smoker


Past Alcohol Use History: None Reported


Past Drug Use History: None Reported





- Past Family History


  ** Sister(s)


Family Medical History: Cancer





  ** Father


Family Medical History: No Reported History


Additional Family Medical History / Comment(s): .





General Exam





- General Exam Comments


Initial Comments: 





85-year-old female.  Alert and oriented 3.


Limitations: no limitations


General appearance: alert, in no apparent distress


Head exam: Present: atraumatic, normocephalic, normal inspection


Eye exam: Present: normal appearance, PERRL, EOMI.  Absent: scleral icterus, 

conjunctival injection, periorbital swelling


ENT exam: Present: normal exam, mucous membranes moist


Neck exam: Present: normal inspection.  Absent: tenderness, meningismus, 

lymphadenopathy


Respiratory exam: Present: rhonchi, other (Wheezing).  Absent: normal lung 

sounds bilaterally, respiratory distress, wheezes, rales, stridor


Cardiovascular Exam: Present: regular rate, normal rhythm, normal heart sounds. 

Absent: systolic murmur, diastolic murmur, rubs, gallop, clicks


GI/Abdominal exam: Present: soft, normal bowel sounds.  Absent: distended, 

tenderness, guarding, rebound, rigid


Extremities exam: Present: normal inspection, full ROM, normal capillary refill.

 Absent: tenderness, pedal edema, joint swelling, calf tenderness


Back exam: Present: normal inspection


Neurological exam: Present: alert, oriented X3, CN II-XII intact


Psychiatric exam: Present: normal affect, normal mood


Skin exam: Present: warm, dry, intact, normal color.  Absent: rash





Course


                                   Vital Signs











  02/11/20 02/11/20 02/11/20





  05:31 06:45 07:00


 


Temperature 98.9 F  


 


Pulse Rate 71 101 H 86


 


Respiratory 16 19 





Rate   


 


Blood Pressure 115/68 136/68 136/68


 


O2 Sat by Pulse 95 90 L 87 L





Oximetry   














  02/11/20 02/11/20 02/11/20





  07:30 08:00 08:49


 


Temperature   


 


Pulse Rate 82 84 92


 


Respiratory 17 25 H 





Rate   


 


Blood Pressure 139/73 139/75 


 


O2 Sat by Pulse 96 94 L 





Oximetry   














  02/11/20





  09:05


 


Temperature 


 


Pulse Rate 92


 


Respiratory 





Rate 


 


Blood Pressure 


 


O2 Sat by Pulse 





Oximetry 














Medical Decision Making





- Medical Decision Making





85-year-old female presents emergency department today for fall, syncopal 

episdoe and hitting head in her bathroom.  She is on blood thinners questions 

that she had her head.  CT of the brain and C-spine reviewed and negative for 

any fracture or intracranial hemorrhage.  Patient reports she has been weak, 

with a bad cough as well as dysuria for the past 7 days.  Patient at this time 

has evidence of urinary tract infection.  EKG showed no ST elevation.  The 

Patient did have an elevated troponin of 0.056.  Initiated on heparin.  She does

have a significant productive cough.  I did start the Patient on Rocephin for 

urinary tract infection, and concern for pneumonia.  Chest x-ray was showing 

chronic changes without any acute process.  She was given a breathing treatment 

did have some clearing of her congestion.  Patient was given IV Solu-Medrol as 

well.  At this time Patient will be admitted for UTI, weakness, syncopal episode

and elevated troponin.  Urine culture and blood culture were completed.  All 

questions answered. 





- Lab Data


Result diagrams: 


                                 02/11/20 06:00





                                 02/11/20 06:00


                                   Lab Results











  02/11/20 02/11/20 02/11/20 Range/Units





  06:00 06:00 06:00 


 


WBC  7.7    (3.8-10.6)  k/uL


 


RBC  4.64    (3.80-5.40)  m/uL


 


Hgb  13.2    (11.4-16.0)  gm/dL


 


Hct  41.5    (34.0-46.0)  %


 


MCV  89.5    (80.0-100.0)  fL


 


MCH  28.6    (25.0-35.0)  pg


 


MCHC  31.9    (31.0-37.0)  g/dL


 


RDW  13.1    (11.5-15.5)  %


 


Plt Count  152    (150-450)  k/uL


 


Neutrophils %  82    %


 


Lymphocytes %  7    %


 


Monocytes %  5    %


 


Eosinophils %  0    %


 


Basophils %  3    %


 


Neutrophils #  6.4    (1.3-7.7)  k/uL


 


Lymphocytes #  0.6 L    (1.0-4.8)  k/uL


 


Monocytes #  0.4    (0-1.0)  k/uL


 


Eosinophils #  0.0    (0-0.7)  k/uL


 


Basophils #  0.2    (0-0.2)  k/uL


 


PT     (9.0-12.0)  sec


 


INR     (<1.2)  


 


APTT     (22.0-30.0)  sec


 


Sodium   133 L   (137-145)  mmol/L


 


Potassium   3.7   (3.5-5.1)  mmol/L


 


Chloride   104   ()  mmol/L


 


Carbon Dioxide   19 L   (22-30)  mmol/L


 


Anion Gap   10   mmol/L


 


BUN   18 H   (7-17)  mg/dL


 


Creatinine   0.83   (0.52-1.04)  mg/dL


 


Est GFR (CKD-EPI)AfAm   75   (>60 ml/min/1.73 sqM)  


 


Est GFR (CKD-EPI)NonAf   65   (>60 ml/min/1.73 sqM)  


 


Glucose   112 H   (74-99)  mg/dL


 


Plasma Lactic Acid Bi    1.0  (0.7-2.0)  mmol/L


 


Calcium   8.0 L   (8.4-10.2)  mg/dL


 


Magnesium   1.8   (1.6-2.3)  mg/dL


 


Total Bilirubin   0.6   (0.2-1.3)  mg/dL


 


AST   39 H   (14-36)  U/L


 


ALT   15   (4-34)  U/L


 


Alkaline Phosphatase   57   ()  U/L


 


Troponin I     (0.000-0.034)  ng/mL


 


Total Protein   5.6 L   (6.3-8.2)  g/dL


 


Albumin   2.9 L   (3.5-5.0)  g/dL


 


Urine Color     


 


Urine Appearance     (Clear)  


 


Urine pH     (5.0-8.0)  


 


Ur Specific Gravity     (1.001-1.035)  


 


Urine Protein     (Negative)  


 


Urine Glucose (UA)     (Negative)  


 


Urine Ketones     (Negative)  


 


Urine Blood     (Negative)  


 


Urine Nitrite     (Negative)  


 


Urine Bilirubin     (Negative)  


 


Urine Urobilinogen     (<2.0)  mg/dL


 


Ur Leukocyte Esterase     (Negative)  


 


Urine RBC     (0-5)  /hpf


 


Urine WBC     (0-5)  /hpf


 


Ur Squamous Epith Cells     (0-4)  /hpf


 


Urine Bacteria     (None)  /hpf


 


Urine Mucus     (None)  /hpf














  02/11/20 02/11/20 02/11/20 Range/Units





  06:00 06:00 07:35 


 


WBC     (3.8-10.6)  k/uL


 


RBC     (3.80-5.40)  m/uL


 


Hgb     (11.4-16.0)  gm/dL


 


Hct     (34.0-46.0)  %


 


MCV     (80.0-100.0)  fL


 


MCH     (25.0-35.0)  pg


 


MCHC     (31.0-37.0)  g/dL


 


RDW     (11.5-15.5)  %


 


Plt Count     (150-450)  k/uL


 


Neutrophils %     %


 


Lymphocytes %     %


 


Monocytes %     %


 


Eosinophils %     %


 


Basophils %     %


 


Neutrophils #     (1.3-7.7)  k/uL


 


Lymphocytes #     (1.0-4.8)  k/uL


 


Monocytes #     (0-1.0)  k/uL


 


Eosinophils #     (0-0.7)  k/uL


 


Basophils #     (0-0.2)  k/uL


 


PT  10.3    (9.0-12.0)  sec


 


INR  1.0    (<1.2)  


 


APTT  29.6    (22.0-30.0)  sec


 


Sodium     (137-145)  mmol/L


 


Potassium     (3.5-5.1)  mmol/L


 


Chloride     ()  mmol/L


 


Carbon Dioxide     (22-30)  mmol/L


 


Anion Gap     mmol/L


 


BUN     (7-17)  mg/dL


 


Creatinine     (0.52-1.04)  mg/dL


 


Est GFR (CKD-EPI)AfAm     (>60 ml/min/1.73 sqM)  


 


Est GFR (CKD-EPI)NonAf     (>60 ml/min/1.73 sqM)  


 


Glucose     (74-99)  mg/dL


 


Plasma Lactic Acid Bi     (0.7-2.0)  mmol/L


 


Calcium     (8.4-10.2)  mg/dL


 


Magnesium     (1.6-2.3)  mg/dL


 


Total Bilirubin     (0.2-1.3)  mg/dL


 


AST     (14-36)  U/L


 


ALT     (4-34)  U/L


 


Alkaline Phosphatase     ()  U/L


 


Troponin I   0.056 H*   (0.000-0.034)  ng/mL


 


Total Protein     (6.3-8.2)  g/dL


 


Albumin     (3.5-5.0)  g/dL


 


Urine Color    Yellow  


 


Urine Appearance    Cloudy H  (Clear)  


 


Urine pH    5.5  (5.0-8.0)  


 


Ur Specific Gravity    1.026  (1.001-1.035)  


 


Urine Protein    1+ H  (Negative)  


 


Urine Glucose (UA)    Negative  (Negative)  


 


Urine Ketones    Negative  (Negative)  


 


Urine Blood    Moderate H  (Negative)  


 


Urine Nitrite    Positive H  (Negative)  


 


Urine Bilirubin    Negative  (Negative)  


 


Urine Urobilinogen    <2.0  (<2.0)  mg/dL


 


Ur Leukocyte Esterase    Large H  (Negative)  


 


Urine RBC    20 H  (0-5)  /hpf


 


Urine WBC    46 H  (0-5)  /hpf


 


Ur Squamous Epith Cells    <1  (0-4)  /hpf


 


Urine Bacteria    Many H  (None)  /hpf


 


Urine Mucus    Rare H  (None)  /hpf














02/11/20 07:03


EKG shows sinus rhythm with sinus arrhythmia occasional PVCs.  Possible anterior

infarct age indeterminate.  Abnormal EKG.  Jugular rate of 99 beats were minute.

 AK interval is 1:30 milliseconds.  QRS duration is 96 ms.  QT QTc is 372/477 

ms.





- Radiology Data


Radiology results: report reviewed


No acute fracture dislocation evident cervical spine.  No acute cranial 

hemorrhage mass effect or midline shift.  Chronic small vessel ischemia evidence

of prior cerebral vascular accidents.  Age-related atrophy.





Disposition


Clinical Impression: 


 Syncope, NSTEMI (non-ST elevated myocardial infarction), UTI (urinary tract 

infection), Weakness, Cough





Disposition: ADMITTED AS IP TO THIS HOSP


Condition: Stable


Is patient prescribed a controlled substance at d/c from ED?: No


Referrals: 


Javier Mancuso MD [Primary Care Provider] - 1-2 days


Time of Disposition: 09:44

## 2020-02-11 NOTE — CT
EXAMINATION TYPE: CT brain maria guadalupeine wo con

 

DATE OF EXAM: 2/11/2020

 

COMPARISON: CT brain 9/6/2017

 

HISTORY: Fall loss of consciousness

 

CT DLP: 1537.7 mGycm

Automated exposure control for dose reduction was used.

 

TECHNIQUE: CT scan of the head and cervical spine are performed without contrast.

 

FINDINGS:   There is no acute intracranial hemorrhage, mass effect, or midline shift identified.  The
 ventricles and sulci are within normal limits in size.  The globes are intact and the visualized sin
uses are clear. There are cerebral vascular calcifications. Cortical atrophy is present. Encephalomal
acia suspected at the inferior aspect of left cerebellar hemisphere is an interval change, bilateral 
occipital lobes show stable low-attenuation. Periventricular white matter shows patchy low attenuatio
n. Atrophy shows a similar appearance.

 

Cervical spine is visualized in its entirety from C1 through upper thoracic levels and demonstrates n
ear anatomic alignment without evidence of acute fracture or dislocation. There is multilevel spondyl
osis. Multilevel facet arthropathy changes present.  Prevertebral soft tissue appears within normal l
imits.  The C1-C2 articulation is unremarkable.  Emphysematous changes are present in the upper lobes
. Dense atherosclerotic calcifications present in carotid bulbs.

 

IMPRESSION:

1. There is no acute fracture or dislocation evident in the cervical spine.

2. No acute intracranial hemorrhage, mass effect, or midline shift is seen. Chronic small vessel isch
emia, evidence of prior cerebrovascular accidents. Age-related atrophy.

## 2020-02-11 NOTE — XR
EXAMINATION TYPE: XR chest 2V

 

DATE OF EXAM: 2/11/2020

 

COMPARISON: 9/6/2017

 

HISTORY: Weakness and shortness of breath

 

TECHNIQUE:  Frontal and lateral views of the chest are obtained.

 

FINDINGS:  Similar-appearing, enlargement of the ascending thoracic aorta in comparison to the prior 
lateral 2017. Extensive atheromatous changes of the thoracic aorta are seen. Cardia mediastinal silho
uette is enlarged but stable. Diffuse osseous demineralization is seen with left-sided humeral arthro
plasty. No focal consolidation, pleural effusion or pneumothorax.

 

IMPRESSION:  Chronic changes with no acute cardiopulmonary process.

## 2020-02-11 NOTE — P.HPIM
History of Present Illness


H&P Date: 02/11/20


Chief Complaint: Syncope





Patient is a 85-year-old female with a known history of coronary artery disease 

status post CABG in 1996 four-vessel, ulcerative colitis, history of CVA with no

residual weakness currently, hypertension, hyperlipidemia, osteoarthritis and 

deafness as well as previous history of smoking came to ER up an episode of 

syncope and fall.  Patient was washing hands after getting up from the toilet 

and suddenly felt dizzy and fell on the floor.  She did lose consciousness 

briefly.  Patient denied any complaints of chest pain.  Was having some 

shortness of breath.  Denied any fever or chills.  Patient has been having chest

congestion and cough without sputum production for the past few days.  As per 

her daughter at bedside patient has not been feeling well for the past 2 weeks. 

Patient was also having right lower abdominal, flank pain along with nausea for 

about 2-3 weeks.  Denied any dysuria or hematuria.  Patient did have a urinary 

tract infection last time about 2 years ago.


No complaints of headache.  No orthopnea no PND.  No leg swelling.  No vomiting 

or diarrhea.  Denied any sick contacts at home.





CT head and cervical spine showed no acute fracture or dislocation.


No acute intracranial hemorrhage, mass effect, or midline shift is seen.  

Chronic small vessel ischemia.  Evidence of prior CVA.  Age related atrophy.





Chest x-ray showed no acute cardio pulmonary process


EKG showed sinus rhythm with sinus arrhythmia.


Troponin 0.056 and 0.049


Sodium 133, WBC 7.7, hemoglobin 13.2


Urinalysis showed nitrite positive, large leukocyte esterase and elevated RBCs 

and WBCs.





Review of Systems





Constitutional: Patient denies any fever or chills .  Generalized weakness.  No 

weight loss.  


Abdomen: Patient denied nausea vomiting and diarrhea and abdominal pain.


Cardiovascular: Patient denies any chest pain or short of breath no 

palpitations.


Respiratory: Patient does have cough without sputum production.  Mild shortness 

of breath


Neurologic: Patient denied any numbness or tingling headache.


Musculoskeletal: Patient denies any complaints of joint swelling or deformity.


Skin: Negative


Psychiatric: Negative


Endocrine: No heat or cold intolerance.  No recent weight gain.


Genitourinary: No dysuria or hematuria.


All other 14 point ROS negative except the above





Past Medical History


Past Medical History: Coronary Artery Disease (CAD), CVA/TIA, GI Bleed, Hearing 

Disorder / Deafness, Hyperlipidemia, Hypertension, Musculoskeletal Disorder, O

steoarthritis (OA)


Additional Past Medical History / Comment(s): PVD.  PEPTIC ULCER DISEASE.  

ULCERATIVE COLITIS.  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 

VISION LT EYE, OCC VISION CAITY & HEARING STROKE.


History of Any Multi-Drug Resistant Organisms: None Reported


Past Surgical History: Appendectomy, Coronary Bypass/CABG, Hysterectomy, Joint 

Replacement, Tonsillectomy


Additional Past Surgical History / Comment(s): Carotid Artery on Left.  

BILATERAL STENTS FOR PVD.  QUAD CABG 1996.  COLONOSCOPY, EGD.  LT SHOULDER 

REPLACEMENT. polyp removed from rectum


Past Anesthesia/Blood Transfusion Reactions: No Reported Reaction


Past Psychological History: No Psychological Hx Reported


Smoking Status: Former smoker


Past Alcohol Use History: None Reported


Past Drug Use History: None Reported





- Past Family History


  ** Sister(s)


Family Medical History: Cancer





  ** Father


Family Medical History: No Reported History


Additional Family Medical History / Comment(s): .





Medications and Allergies


                                Home Medications











 Medication  Instructions  Recorded  Confirmed  Type


 


Atorvastatin [Lipitor] 20 mg PO HS 01/22/15 02/11/20 History


 


Apixaban [Eliquis] 2.5 mg PO BID 04/26/17 02/11/20 History


 


Losartan [Cozaar] 50 mg PO BID 02/11/20 02/11/20 History








                                    Allergies











Allergy/AdvReac Type Severity Reaction Status Date / Time


 


ciprofloxacin HCl Allergy Mild Itching Verified 09/06/17 22:43





[From Cipro]     


 


adhesive AdvReac Mild Itching Verified 09/06/17 22:43














Physical Exam


Vitals: 


                                   Vital Signs











  Temp Pulse Resp BP Pulse Ox


 


 02/11/20 12:04   97  18   92 L


 


 02/11/20 12:00   100  9 L   92 L


 


 02/11/20 11:30   89  15  108/58  91 L


 


 02/11/20 11:00   94  16  108/58 


 


 02/11/20 10:30   102 H  17  107/72  91 L


 


 02/11/20 10:00    17  124/77  91 L


 


 02/11/20 09:30    18  119/66  94 L


 


 02/11/20 09:05   92   


 


 02/11/20 09:00   92  17  133/73  100


 


 02/11/20 08:49   92   


 


 02/11/20 08:30   89  11 L  144/67  96


 


 02/11/20 08:00   84  25 H  139/75  94 L


 


 02/11/20 07:30   82  17  139/73  96


 


 02/11/20 07:00   86   136/68  87 L


 


 02/11/20 06:45   101 H  19  136/68  90 L


 


 02/11/20 05:31  98.9 F  71  16  115/68  95








                                Intake and Output











 02/10/20 02/11/20 02/11/20





 22:59 06:59 14:59


 


Other:   


 


  Weight  68.039 kg 














PHYSICAL EXAMINATION: 


Patient is lying in the bed comfortably, no acute distress, awake alert and 

oriented.. 


HEENT: Normocephalic. Neck is supple. Pupils reactive. Nostrils clear. Oral 

cavity is moist. Ears reveal no drainage. 


Neck reveals no JVD, carotid bruits, or thyromegaly. 


CHEST EXAMINATION: Trachea is central. Symmetrical expansion.  Bibasilar 

diminished air entry.  Prolonged expiration.  Lung fields clear to auscultation 

and percussion. 


CARDIAC: Normal S1, S2 with no gallops. No murmurs 


ABDOMEN: Soft. Bowel sounds normal. No organomegaly. No abdominal bruits. 


Extremities: reveal no edema.  No clubbing or cyanosis


Neurologically awake, alert, oriented x3 with well-coordinated movements.  No 

focal deficits noted


Skin: No rash or skin lesions. 


Psychiatric: Coperative.  Nonsuicidal


Musculoskeletal: No joint swelling or deformity.  Normal range of motion.








Results


CBC & Chem 7: 


                                 02/11/20 06:00





                                 02/11/20 06:00


Labs: 


                  Abnormal Lab Results - Last 24 Hours (Table)











  02/11/20 02/11/20 02/11/20 Range/Units





  06:00 06:00 06:00 


 


Lymphocytes #  0.6 L    (1.0-4.8)  k/uL


 


Sodium   133 L   (137-145)  mmol/L


 


Carbon Dioxide   19 L   (22-30)  mmol/L


 


BUN   18 H   (7-17)  mg/dL


 


Glucose   112 H   (74-99)  mg/dL


 


Calcium   8.0 L   (8.4-10.2)  mg/dL


 


AST   39 H   (14-36)  U/L


 


Troponin I    0.056 H*  (0.000-0.034)  ng/mL


 


Total Protein   5.6 L   (6.3-8.2)  g/dL


 


Albumin   2.9 L   (3.5-5.0)  g/dL


 


Urine Appearance     (Clear)  


 


Urine Protein     (Negative)  


 


Urine Blood     (Negative)  


 


Urine Nitrite     (Negative)  


 


Ur Leukocyte Esterase     (Negative)  


 


Urine RBC     (0-5)  /hpf


 


Urine WBC     (0-5)  /hpf


 


Urine Bacteria     (None)  /hpf


 


Urine Mucus     (None)  /hpf














  02/11/20 Range/Units





  07:35 


 


Lymphocytes #   (1.0-4.8)  k/uL


 


Sodium   (137-145)  mmol/L


 


Carbon Dioxide   (22-30)  mmol/L


 


BUN   (7-17)  mg/dL


 


Glucose   (74-99)  mg/dL


 


Calcium   (8.4-10.2)  mg/dL


 


AST   (14-36)  U/L


 


Troponin I   (0.000-0.034)  ng/mL


 


Total Protein   (6.3-8.2)  g/dL


 


Albumin   (3.5-5.0)  g/dL


 


Urine Appearance  Cloudy H  (Clear)  


 


Urine Protein  1+ H  (Negative)  


 


Urine Blood  Moderate H  (Negative)  


 


Urine Nitrite  Positive H  (Negative)  


 


Ur Leukocyte Esterase  Large H  (Negative)  


 


Urine RBC  20 H  (0-5)  /hpf


 


Urine WBC  46 H  (0-5)  /hpf


 


Urine Bacteria  Many H  (None)  /hpf


 


Urine Mucus  Rare H  (None)  /hpf








                      Microbiology - Last 24 Hours (Table)











 02/11/20 07:35 Urine Culture - Preliminary





 Urine,Catheterized 














Thrombosis Risk Factor Assmnt





- DVT/VTE Prophylaxis


DVT/VTE Prophylaxis: Pharmacologic Prophylaxis ordered





Assessment and Plan


Assessment: 





Dizziness and syncope.  Possible orthostatic hypotension.


Acute urinary tract infection


Elevated troponin level.  Possible type II MI.


Hypovolemic hyponatremia


Coronary artery disease with history of CABG-4 vessel in 1996


History of TIAs/CVA with no residual weakness


Hearing disorder/deafness


Hypertension


Hyperlipidemia


Osteoarthritis


Peripheral vascular disease


Peptic ulcer disease


History of ulcerative colitis


Previous history of smoking


DVT prophylaxis.  Patient on heparin drip





Patient will be continued on telemetry monitoring.  Continue with gentle 

hydration and antibiotics the form of ceftriaxone.  Follow-up urine culture 

report.


Patient was started on heparin drip due to elevated troponin level and 

cardiology was consulted.  Continue with aspirin and statins.


We will hold blood pressure medications, losartan which she takes at home due to

hypotension.


Further recommendations based on the clinical course.  Prognosis guarded with 

multiple medical problems and comorbid conditions.


Time with Patient: Greater than 30

## 2020-02-12 LAB
ANION GAP SERPL CALC-SCNC: 8 MMOL/L
BASOPHILS # BLD AUTO: 0 K/UL (ref 0–0.2)
BASOPHILS NFR BLD AUTO: 0 %
BUN SERPL-SCNC: 19 MG/DL (ref 7–17)
CALCIUM SPEC-MCNC: 7.7 MG/DL (ref 8.4–10.2)
CHLORIDE SERPL-SCNC: 109 MMOL/L (ref 98–107)
CHOLEST SERPL-MCNC: 113 MG/DL (ref ?–200)
CO2 SERPL-SCNC: 21 MMOL/L (ref 22–30)
EOSINOPHIL # BLD AUTO: 0 K/UL (ref 0–0.7)
EOSINOPHIL NFR BLD AUTO: 0 %
ERYTHROCYTE [DISTWIDTH] IN BLOOD BY AUTOMATED COUNT: 4.33 M/UL (ref 3.8–5.4)
ERYTHROCYTE [DISTWIDTH] IN BLOOD: 13 % (ref 11.5–15.5)
GLUCOSE SERPL-MCNC: 142 MG/DL (ref 74–99)
HCT VFR BLD AUTO: 38.8 % (ref 34–46)
HDLC SERPL-MCNC: 49 MG/DL (ref 40–60)
HGB BLD-MCNC: 12.5 GM/DL (ref 11.4–16)
LDLC SERPL CALC-MCNC: 45 MG/DL (ref 0–99)
LYMPHOCYTES # SPEC AUTO: 0.7 K/UL (ref 1–4.8)
LYMPHOCYTES NFR SPEC AUTO: 22 %
MCH RBC QN AUTO: 28.8 PG (ref 25–35)
MCHC RBC AUTO-ENTMCNC: 32.1 G/DL (ref 31–37)
MCV RBC AUTO: 89.6 FL (ref 80–100)
MONOCYTES # BLD AUTO: 0.2 K/UL (ref 0–1)
MONOCYTES NFR BLD AUTO: 5 %
NEUTROPHILS # BLD AUTO: 2.2 K/UL (ref 1.3–7.7)
NEUTROPHILS NFR BLD AUTO: 69 %
PLATELET # BLD AUTO: 171 K/UL (ref 150–450)
POTASSIUM SERPL-SCNC: 3.9 MMOL/L (ref 3.5–5.1)
SODIUM SERPL-SCNC: 138 MMOL/L (ref 137–145)
TRIGL SERPL-MCNC: 96 MG/DL (ref ?–150)
WBC # BLD AUTO: 3.1 K/UL (ref 3.8–10.6)

## 2020-02-12 RX ADMIN — ATORVASTATIN CALCIUM SCH MG: 20 TABLET, FILM COATED ORAL at 21:12

## 2020-02-12 RX ADMIN — GUAIFENESIN PRN MG: 200 SOLUTION ORAL at 22:52

## 2020-02-12 RX ADMIN — IPRATROPIUM BROMIDE AND ALBUTEROL SULFATE SCH ML: .5; 3 SOLUTION RESPIRATORY (INHALATION) at 01:36

## 2020-02-12 RX ADMIN — HEPARIN SODIUM SCH MLS/HR: 10000 INJECTION, SOLUTION INTRAVENOUS at 21:20

## 2020-02-12 RX ADMIN — ASPIRIN 81 MG CHEWABLE TABLET SCH MG: 81 TABLET CHEWABLE at 10:25

## 2020-02-12 RX ADMIN — IPRATROPIUM BROMIDE AND ALBUTEROL SULFATE SCH: .5; 3 SOLUTION RESPIRATORY (INHALATION) at 07:47

## 2020-02-12 RX ADMIN — IPRATROPIUM BROMIDE AND ALBUTEROL SULFATE SCH: .5; 3 SOLUTION RESPIRATORY (INHALATION) at 11:22

## 2020-02-12 RX ADMIN — IPRATROPIUM BROMIDE AND ALBUTEROL SULFATE SCH: .5; 3 SOLUTION RESPIRATORY (INHALATION) at 21:15

## 2020-02-12 RX ADMIN — LOSARTAN POTASSIUM SCH MG: 50 TABLET, FILM COATED ORAL at 22:51

## 2020-02-12 RX ADMIN — IPRATROPIUM BROMIDE AND ALBUTEROL SULFATE SCH: .5; 3 SOLUTION RESPIRATORY (INHALATION) at 15:21

## 2020-02-12 RX ADMIN — IPRATROPIUM BROMIDE AND ALBUTEROL SULFATE SCH ML: .5; 3 SOLUTION RESPIRATORY (INHALATION) at 21:17

## 2020-02-12 NOTE — P.CRDCN
History of Present Illness


Consult date: 02/12/20


Requesting physician: Rob Monzon


Reason for Consult (text): 





Abnormal troponins


Chief complaint: Syncope


History of present illness: 


This is a pleasant 85-year-old  female with known history of coronary 

artery disease and prior bypass surgery, history of prior CVA.  hypertension, 

hyperlipidemia, peripheral vascular disease, follows with Dr. VC Fletcher the 

office.  He presents to the hospital following a syncopal episode.  According to

the patient, she had got up to use the bathroom, she stood up at the sink to 

wash her hands and the next thing she recalled was waking up on the floor.  She 

states that she had no warning prior to passing out, she does not recall if she 

was dizzy or lightheaded, no symptoms prior at all.  Upon wakening she noticed 

that she did lose control of bowel function.  According to the patient, she 

states she's been dealing with a cold over the past couple of weeks.  She does 

state that she has been experiencing some chest pressure and heaviness but 

thought it was secondary to congestion.  Patient also states that she has been 

having some episodes of dizziness.  Patient's home medications include Cozaar 50

mg one tablet by mouth twice a day, Lipitor 20 mg daily, Eliquis 2-1/2 mg one 

tablet by mouth twice a day.  Patient is unsure as to whether or not she's been 

told in the past to have an irregular heartbeat, while the patient is taking E

liquis.  CAT scan of the head and cervical spine did not reveal any acute 

fracture or dislocation.  No acute intracranial hemorrhage, mass effect, or 

midline shift.  Chest x-ray shows chronic changes without any acute 

cardiopulmonary process.  EKG shows a normal sinus rhythm with occasional PVC.  

Blood pressure 140/70, heart rate in the 80s.  White blood cell count 7.7 on 

admission, 3.1 this morning, hemoglobin 12.5, platelet count 171.  Sodium 138, 

potassium 3.9, BUN 19, creatinine 0.7.  BNP 5210.  Troponins 0.056, 049, 0.045. 

Positive UTI.  C. diff negative.  At the time of my examination this morning, 

patient complains of feeling weak, she denies any dizziness or lightheadedness, 

complains of mild pressure in the chest.





Past Medical History


Past Medical History: Coronary Artery Disease (CAD), CVA/TIA, GI Bleed, Hearing 

Disorder / Deafness, Hyperlipidemia, Hypertension, Musculoskeletal Disorder, 

Osteoarthritis (OA)


Additional Past Medical History / Comment(s): PVD.  PEPTIC ULCER DISEASE.  

ULCERATIVE COLITIS.  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 

VISION LT EYE, OCC VISION CAITY & HEARING STROKE.


History of Any Multi-Drug Resistant Organisms: None Reported


Past Surgical History: Appendectomy, Coronary Bypass/CABG, Hysterectomy, Joint 

Replacement, Tonsillectomy


Additional Past Surgical History / Comment(s): Carotid Artery on Left.  

BILATERAL STENTS FOR PVD.  QUAD CABG 1996.  COLONOSCOPY, EGD.  LT SHOULDER 

REPLACEMENT. polyp removed from rectum


Past Anesthesia/Blood Transfusion Reactions: No Reported Reaction


Past Psychological History: No Psychological Hx Reported


Smoking Status: Former smoker


Past Alcohol Use History: None Reported


Past Drug Use History: None Reported





- Past Family History


  ** Sister(s)


Family Medical History: Cancer





  ** Father


Family Medical History: No Reported History


Additional Family Medical History / Comment(s): .





Medications and Allergies


                                Home Medications











 Medication  Instructions  Recorded  Confirmed  Type


 


Atorvastatin [Lipitor] 20 mg PO HS 01/22/15 02/11/20 History


 


Apixaban [Eliquis] 2.5 mg PO BID 04/26/17 02/11/20 History


 


Losartan [Cozaar] 50 mg PO BID 02/11/20 02/11/20 History








                                    Allergies











Allergy/AdvReac Type Severity Reaction Status Date / Time


 


ciprofloxacin HCl Allergy Mild Itching Verified 09/06/17 22:43





[From Cipro]     


 


adhesive AdvReac Mild Itching Verified 09/06/17 22:43














Physical Exam


Vitals: 


                                   Vital Signs











  Temp Pulse Pulse Resp BP BP Pulse Ox


 


 02/12/20 04:00  97.6 F   83  16   116/65  95


 


 02/12/20 02:01   74     


 


 02/12/20 01:38   72     


 


 02/12/20 00:00  98.5 F   88  18   131/68  94 L


 


 02/11/20 20:42   72     


 


 02/11/20 20:32   64      95


 


 02/11/20 20:00  98.3 F   79  16   130/68  93 L


 


 02/11/20 16:00  99.0 F   85    114/66  92 L


 


 02/11/20 13:50   85   21  116/62   99


 


 02/11/20 13:30   85   21  116/62  


 


 02/11/20 13:00   88   22  112/86   93 L


 


 02/11/20 12:30   90   30 H  96/56   94 L


 


 02/11/20 12:04   97   18    92 L


 


 02/11/20 12:00   100   9 L    92 L


 


 02/11/20 11:30   89   15  108/58   91 L


 


 02/11/20 11:00   94   16  108/58  


 


 02/11/20 10:30   102 H   17  107/72   91 L


 


 02/11/20 10:00     17  124/77   91 L


 


 02/11/20 09:30     18  119/66   94 L


 


 02/11/20 09:05   92     


 


 02/11/20 09:00   92   17  133/73   100


 


 02/11/20 08:49   92     


 


 02/11/20 08:30   89   11 L  144/67   96








                                Intake and Output











 02/11/20 02/12/20 02/12/20





 22:59 06:59 14:59


 


Intake Total 49.126  


 


Balance 49.126  


 


Intake:   


 


  Intake, IV Titration 49.126  





  Amount   


 


    Heparin Sod,Pork in 0.45% 49.126  





    NaCl 25,000 unit In 0.45   





    % NaCl 1 250ml.bag @ 12   





    UNITS/KG/HR 8.165 mls/hr   





    IV .Q24H [facility] Rx#:   





    [id number]   


 


Other:   


 


  # Voids 1 1 


 


  # Bowel Movements  1 


 


  Weight  70.8 kg 














PHYSICAL EXAMINATION: 





GENERAL: 85-year-old  female in no acute distress at the time of my 

examination





HEENT: Head is atraumatic, normocephalic.  Pupils equal, round.  Sclera 

anicteric. Conjunctiva are clear.  Mucous membranes of the mouth are moist.  

Neck is supple.  There is no elevated jugular venous pressure.  No carotid  

bruit is heard.





HEART EXAMINATION: Heart S1, S2 normal.  No murmur or gallop heard.





CHEST EXAMINATION: Lungs reveal some scattered rhonchi that clear with cough.





ABDOMEN:  Soft, nontender. Bowel sounds are heard. No organomegaly noted.


 


EXTREMITIES: 2+ peripheral pulses with no evidence of peripheral edema and no 

calf tenderness noted.





NEUROLOGIC patient is awake, alert and oriented 3


 


.


 








Results





                                 02/12/20 05:56





                                 02/12/20 05:56


                                 Cardiac Enzymes











  02/11/20 02/11/20 Range/Units





  12:34 18:03 


 


Troponin I  0.049 H*  0.045 H*  (0.000-0.034)  ng/mL








                                   Coagulation











  02/11/20 02/11/20 02/12/20 Range/Units





  14:55 21:55 05:56 


 


APTT  137.3 H*  61.7 H  98.7 H  (22.0-30.0)  sec








                                     Lipids











  02/12/20 Range/Units





  05:56 


 


Triglycerides  96  (<150)  mg/dL


 


Cholesterol  113  (<200)  mg/dL


 


HDL Cholesterol  49  (40-60)  mg/dL








                                       CBC











  02/12/20 Range/Units





  05:56 


 


WBC  3.1 L  (3.8-10.6)  k/uL


 


RBC  4.33  (3.80-5.40)  m/uL


 


Hgb  12.5  (11.4-16.0)  gm/dL


 


Hct  38.8  (34.0-46.0)  %


 


Plt Count  171  (150-450)  k/uL








                          Comprehensive Metabolic Panel











  02/12/20 Range/Units





  05:56 


 


Sodium  138  (137-145)  mmol/L


 


Potassium  3.9  (3.5-5.1)  mmol/L


 


Chloride  109 H  ()  mmol/L


 


Carbon Dioxide  21 L  (22-30)  mmol/L


 


BUN  19 H  (7-17)  mg/dL


 


Creatinine  0.71  (0.52-1.04)  mg/dL


 


Glucose  142 H  (74-99)  mg/dL


 


Calcium  7.7 L  (8.4-10.2)  mg/dL








                               Current Medications











Generic Name Dose Route Start Last Admin





  Trade Name Freq  PRN Reason Stop Dose Admin


 


Albuterol/Ipratropium  3 ml  02/12/20 01:39 





  Duoneb 0.5 Mg-3 Mg/3 Ml Soln  INHALATION  





  RT-QID PRN  





  Shortness Of Breath Or Wheezing  


 


Albuterol/Ipratropium  3 ml  02/12/20 08:00  02/12/20 07:47





  Duoneb 0.5 Mg-3 Mg/3 Ml Soln  INHALATION   Not Given





  RT-QID [facility]  


 


Aspirin  81 mg  02/12/20 09:00 





  Aspirin  PO  





  DAILY [facility]  


 


Atorvastatin Calcium  20 mg  02/11/20 21:00  02/11/20 21:19





  Lipitor  PO   20 mg





  [facility]   Administration


 


Heparin Sodium (Porcine)  0 unit  02/11/20 08:38 





  Heparin  IV  





  PER PROTOCOL PRN  





  Low PTT  





  Protocol  


 


Heparin Sodium/Sodium Chloride  250 mls @ 8.165 mls/hr  02/11/20 08:45  02/11/20

19:50





  25,000 unit/ Sodium Chloride  IV   10 units/kg/hr





  .Q24H [facility]   6.804 mls/hr





    Titration





  Protocol  





  12 UNITS/KG/HR  


 


Ceftriaxone Sodium 1 gm/  50 mls @ 100 mls/hr  02/11/20 21:00  02/11/20 21:19





  Sodium Chloride  IVPB   100 mls/hr





  Q12HR [facility]   Administration


 


Morphine Sulfate  4 mg  02/11/20 09:45 





  Morphine Sulfate (Inj)  IV  





  Q5M PRN  





  Chest Pain  


 


Nitroglycerin  0.4 mg  02/11/20 09:45 





  Nitrostat  SUBLINGUAL  





  Q5M PRN  





  Chest Pain  








                                Intake and Output











 02/11/20 02/12/20 02/12/20





 22:59 06:59 14:59


 


Intake Total 49.126  


 


Balance 49.126  


 


Intake:   


 


  Intake, IV Titration 49.126  





  Amount   


 


    Heparin Sod,Pork in 0.45% 49.126  





    NaCl 25,000 unit In 0.45   





    % NaCl 1 250ml.bag @ 12   





    UNITS/KG/HR 8.165 mls/hr   





    IV .Q24H [facility] Rx#:   





    [id number]   


 


Other:   


 


  # Voids 1 1 


 


  # Bowel Movements  1 


 


  Weight  70.8 kg 








                                        





                                 02/12/20 05:56 





                                 02/12/20 05:56 











Assessment and Plan


Plan: 


Assessment and plan


#1 syncope, rule out cardiac causes


#2 abnormality in troponin, no significant rise and fall pattern.  Patient does 

however complain of intermittent chest pressure and heaviness.  EKG shows normal

sinus rhythm with no acute changes.


3 known history of coronary artery disease with prior bypass surgery


#4 hypertension


#5 hyperlipidemia


#6 prior history of smoking


#7 peripheral vascular disease


#8 prior CVA


#9 elevated BNP level with no clear-cut evidence of congestive heart failure





Plan 


We will obtain an echocardiogram with Doppler study.  Check orthostatic heart 

rate and blood pressure every shift, monitor for any tachycardia or bradycardia 

arrhythmias.  Obtain a d-dimer.  Further recommendations to follow.





DNP note has been reviewed, I agree with a documented findings and plan of care.

 Patient was seen and examined.

## 2020-02-12 NOTE — ECHOF
Referral Reason:elevated trop



MEASUREMENTS

--------

HEIGHT: 160.0 cm

WEIGHT: 70.8 kg

BP: 

IVSd:   1.2 cm     (0.6 - 1.1)

LVIDd:   4.5 cm     (3.9 - 5.3)

LVPWd:   1.2 cm     (0.6 - 1.1)

IVSs:   1.5 cm

LVIDs:   3.4 cm

LVPWs:   1.6 cm

LAESV Index (A-L):   51.27 ml/m

Ao Diam:   2.6 cm     (2.0 - 3.7)

AV Cusp:   1.8 cm     (1.5 - 2.6)

LA Diam:   4.4 cm     (2.7 - 3.8)

MV EXCURSION:   14.577 mm     (> 18.000)

MV EF SLOPE:   34 mm/s     (70 - 150)

EPSS:   1.0 cm

MV E Magdi:   1.42 m/s

MV DecT:   324 ms

MV A Magdi:   1.02 m/s

MV E/A Ratio:   1.39 

AV maxP.30 mmHg

AV meanP.34 mmHg

AR PHT:   125 ms

RAP:   5.00 mmHg

RVSP:   34.27 mmHg







FINDINGS

--------

Sinus rhythm.

This was a technically good study.

The left ventricular size is normal.   There is mild concentric left ventricular hypertrophy.   Overa
ll left ventricular systolic function is mildly impaired with, an EF between 45 - 50 %.   Basal infer
oseptal LV wall motion is hypokinetic.    Basal inferolateral hypokinesis.

The right ventricle is normal in size.

LA is severely dilated >40 ml/m2

The right atrial size is normal.

Aortic valve is trileaflet and is mildly thickened.   There is mild aortic valve sclerosis.   Trace a
mount of aortic regurgitation.

The mitral valve is normal.   The mitral valve leaflets are mildly thickened.   Moderate mitral annul
ar calcification present.   Moderate mitral regurgitation is present.    The peak and mean MV gradien
ts are 16.24mmHg 5.03mmHg as measured by doppler.   Mild-to-moderate mitral stenosis.

The tricuspid valve appears structurally normal.   Mild tricuspid regurgitation present.   Right vent
ricular systolic pressure is normal at < 35 mmHg.

There is no pulmonic regurgitation present.

The aortic root size is normal.

Normal inferior vena cava with normal inspiratory collapse consistent with estimated right atrial pre
ssure of  5 mmHg.

There is no pericardial effusion.



CONCLUSIONS

--------

1. Sinus rhythm.

2. This was a technically good study.

3. The left ventricular size is normal.

4. There is mild concentric left ventricular hypertrophy.

5. Basal inferoseptal LV wall motion is hypokinetic.

6. Basal inferolateral hypokinesis.

7. The right ventricle is normal in size.

8. LA is severely dilated >40 ml/m2

9. The right atrial size is normal.

10. Aortic valve is trileaflet and is mildly thickened.

11. There is mild aortic valve sclerosis.

12. Trace amount of aortic regurgitation.

13. The mitral valve is normal.

14. The mitral valve leaflets are mildly thickened.

15. Moderate mitral annular calcification present.

16. Moderate mitral regurgitation is present.

17. The peak and mean MV gradients are 16.24mmHg 5.03mmHg as measured by doppler.

18. Mild-to-moderate mitral stenosis.

19. The tricuspid valve appears structurally normal.

20. Mild tricuspid regurgitation present.

21. Right ventricular systolic pressure is normal at < 35 mmHg.

22. There is no pulmonic regurgitation present.

23. The aortic root size is normal.

24. Normal inferior vena cava with normal inspiratory collapse consistent with estimated right atrial
 pressure of  5 mmHg.

25. There is no pericardial effusion.





SONOGRAPHER: Jazmine Roberts RDCS

## 2020-02-12 NOTE — P.PN
Subjective


Progress Note Date: 02/12/20


Principal diagnosis: 





Acute urinary tract infection


Elevated troponin level and chest tightness





Patient is a 85-year-old female with a known history of coronary artery disease 

status post CABG in 1996 four-vessel, ulcerative colitis, history of CVA with no

residual weakness currently, hypertension, hyperlipidemia, osteoarthritis and 

deafness as well as previous history of smoking came to ER up an episode of 

syncope and fall.  Patient was washing hands after getting up from the toilet 

and suddenly felt dizzy and fell on the floor.  She did lose consciousness 

briefly.  Patient denied any complaints of chest pain.  Was having some 

shortness of breath.  Denied any fever or chills.  Patient has been having chest

congestion and cough without sputum production for the past few days.  As per 

her daughter at bedside patient has not been feeling well for the past 2 weeks. 

Patient was also having right lower abdominal, flank pain along with nausea for 

about 2-3 weeks.  Denied any dysuria or hematuria.  Patient did have a urinary 

tract infection last time about 2 years ago.


No complaints of headache.  No orthopnea no PND.  No leg swelling.  No vomiting 

or diarrhea.  Denied any sick contacts at home.





CT head and cervical spine showed no acute fracture or dislocation.


No acute intracranial hemorrhage, mass effect, or midline shift is seen.  

Chronic small vessel ischemia.  Evidence of prior CVA.  Age related atrophy.





Chest x-ray showed no acute cardio pulmonary process


EKG showed sinus rhythm with sinus arrhythmia.


Troponin 0.056 and 0.049


Sodium 133, WBC 7.7, hemoglobin 13.2


Urinalysis showed nitrite positive, large leukocyte esterase and elevated RBCs 

and WBCs.





02/12/2020


Patient is currently sitting in the chair comfortably.  Tolerating oral diet.  

No complaints of chest pain or shortness of breath.


BNP is elevated to 5200.  Patient does have occasional chest tightness 

yesterday.  Currently denied any symptoms.


Otherwise patient is being continued on antibiotics for urinary tract infection 

and follow-up urine culture report.  2-D echocardiogram was ordered and 

cardiology is following.  Patient has been afebrile.





Current medications reviewed.





Objective





- Vital Signs


Vital signs: 


                                   Vital Signs











Temp  97.8 F   02/12/20 11:41


 


Pulse  76   02/12/20 11:41


 


Resp  18   02/12/20 11:41


 


BP  140/70   02/12/20 11:41


 


Pulse Ox  97   02/12/20 11:41








                                 Intake & Output











 02/11/20 02/12/20 02/12/20





 18:59 06:59 18:59


 


Intake Total 49.126 0 


 


Balance 49.126 0 


 


Weight 68.039 kg 70.8 kg 70.8 kg


 


Intake:   


 


  Intake, IV Titration 49.126 0 





  Amount   


 


    Heparin Sod,Pork in 0.45% 49.126 0 





    NaCl 25,000 unit In 0.45   





    % NaCl 1 250ml.bag @ 12   





    UNITS/KG/HR 8.165 mls/hr   





    IV .Q24H [facility] Rx#:   





    [id number]   


 


Other:   


 


  # Voids 1 1 1


 


  # Bowel Movements  1 1














- Exam





PHYSICAL EXAMINATION: 


Patient is lying in the bed comfortably, no acute distress, awake alert and 

oriented.. 


HEENT: Normocephalic. Neck is supple. Pupils reactive. Nostrils clear. Oral 

cavity is moist. Ears reveal no drainage. 


Neck reveals no JVD, carotid bruits, or thyromegaly. 


CHEST EXAMINATION: Trachea is central. Symmetrical expansion. Lung fields clear 

to auscultation and percussion. 


CARDIAC: Normal S1, S2 with no gallops. No murmurs 


ABDOMEN: Soft. Bowel sounds normal. No organomegaly. No abdominal bruits. 


Extremities: reveal no edema.  No clubbing or cyanosis


Neurologically awake, alert, oriented x3 with well-coordinated movements.  No 

focal deficits noted


Skin: No rash or skin lesions. 


Psychiatric: Coperative.  Nonsuicidal


Musculoskeletal: No joint swelling or deformity.  Normal range of motion.








- Labs


CBC & Chem 7: 


                                 02/12/20 05:56





                                 02/12/20 05:56


Labs: 


                  Abnormal Lab Results - Last 24 Hours (Table)











  02/11/20 02/11/20 02/11/20 Range/Units





  12:34 14:55 18:03 


 


WBC     (3.8-10.6)  k/uL


 


Lymphocytes #     (1.0-4.8)  k/uL


 


APTT   137.3 H*   (22.0-30.0)  sec


 


Chloride     ()  mmol/L


 


Carbon Dioxide     (22-30)  mmol/L


 


BUN     (7-17)  mg/dL


 


Glucose     (74-99)  mg/dL


 


Calcium     (8.4-10.2)  mg/dL


 


Troponin I  0.049 H*   0.045 H*  (0.000-0.034)  ng/mL














  02/11/20 02/12/20 02/12/20 Range/Units





  21:55 05:56 05:56 


 


WBC   3.1 L   (3.8-10.6)  k/uL


 


Lymphocytes #   0.7 L   (1.0-4.8)  k/uL


 


APTT  61.7 H    (22.0-30.0)  sec


 


Chloride    109 H  ()  mmol/L


 


Carbon Dioxide    21 L  (22-30)  mmol/L


 


BUN    19 H  (7-17)  mg/dL


 


Glucose    142 H  (74-99)  mg/dL


 


Calcium    7.7 L  (8.4-10.2)  mg/dL


 


Troponin I     (0.000-0.034)  ng/mL














  02/12/20 Range/Units





  05:56 


 


WBC   (3.8-10.6)  k/uL


 


Lymphocytes #   (1.0-4.8)  k/uL


 


APTT  98.7 H  (22.0-30.0)  sec


 


Chloride   ()  mmol/L


 


Carbon Dioxide   (22-30)  mmol/L


 


BUN   (7-17)  mg/dL


 


Glucose   (74-99)  mg/dL


 


Calcium   (8.4-10.2)  mg/dL


 


Troponin I   (0.000-0.034)  ng/mL








                      Microbiology - Last 24 Hours (Table)











 02/11/20 07:35 Urine Culture - Preliminary





 Urine,Catheterized 














Assessment and Plan


Assessment: 





Dizziness and syncope.  Possible orthostatic hypotension.


Acute urinary tract infection


Elevated troponin level.  Possible type II MI.


Elevated BNP without evidence of CHF.


Hypovolemic hyponatremia.  Improved


Coronary artery disease with history of CABG-4 vessel in 1996


History of TIAs/CVA with no residual weakness


Hearing disorder/deafness


Hypertension


Hyperlipidemia


Osteoarthritis


Peripheral vascular disease


Peptic ulcer disease


History of ulcerative colitis


Previous history of smoking


DVT prophylaxis.  Patient on heparin drip





Patient will be continued on telemetry monitoring.  Continue with gentle 

hydration and antibiotics the form of ceftriaxone.  Follow-up urine culture 

report.


Patient was started on heparin drip due to elevated troponin level and 

cardiology has seen the patient..  Continue with aspirin and statins.


We will hold blood pressure medications, losartan which she takes at home due to

hypotension.


Further recommendations based on the clinical course.  Prognosis guarded with 

multiple medical problems and comorbid conditions.


Time with Patient: Greater than 30

## 2020-02-13 VITALS
RESPIRATION RATE: 18 BRPM | TEMPERATURE: 98.3 F | SYSTOLIC BLOOD PRESSURE: 125 MMHG | HEART RATE: 87 BPM | DIASTOLIC BLOOD PRESSURE: 77 MMHG

## 2020-02-13 LAB
ANION GAP SERPL CALC-SCNC: 6 MMOL/L
BASOPHILS # BLD AUTO: 0 K/UL (ref 0–0.2)
BASOPHILS NFR BLD AUTO: 1 %
BUN SERPL-SCNC: 21 MG/DL (ref 7–17)
CALCIUM SPEC-MCNC: 7.7 MG/DL (ref 8.4–10.2)
CHLORIDE SERPL-SCNC: 108 MMOL/L (ref 98–107)
CO2 SERPL-SCNC: 24 MMOL/L (ref 22–30)
EOSINOPHIL # BLD AUTO: 0 K/UL (ref 0–0.7)
EOSINOPHIL NFR BLD AUTO: 1 %
ERYTHROCYTE [DISTWIDTH] IN BLOOD BY AUTOMATED COUNT: 4.31 M/UL (ref 3.8–5.4)
ERYTHROCYTE [DISTWIDTH] IN BLOOD: 13.3 % (ref 11.5–15.5)
GLUCOSE SERPL-MCNC: 111 MG/DL (ref 74–99)
HCT VFR BLD AUTO: 38.8 % (ref 34–46)
HGB BLD-MCNC: 12.3 GM/DL (ref 11.4–16)
LYMPHOCYTES # SPEC AUTO: 1.4 K/UL (ref 1–4.8)
LYMPHOCYTES NFR SPEC AUTO: 22 %
MAGNESIUM SPEC-SCNC: 1.9 MG/DL (ref 1.6–2.3)
MCH RBC QN AUTO: 28.5 PG (ref 25–35)
MCHC RBC AUTO-ENTMCNC: 31.6 G/DL (ref 31–37)
MCV RBC AUTO: 90.1 FL (ref 80–100)
MONOCYTES # BLD AUTO: 0.3 K/UL (ref 0–1)
MONOCYTES NFR BLD AUTO: 5 %
NEUTROPHILS # BLD AUTO: 4.4 K/UL (ref 1.3–7.7)
NEUTROPHILS NFR BLD AUTO: 70 %
PLATELET # BLD AUTO: 163 K/UL (ref 150–450)
POTASSIUM SERPL-SCNC: 4.1 MMOL/L (ref 3.5–5.1)
SODIUM SERPL-SCNC: 138 MMOL/L (ref 137–145)
WBC # BLD AUTO: 6.3 K/UL (ref 3.8–10.6)

## 2020-02-13 RX ADMIN — ASPIRIN 81 MG CHEWABLE TABLET SCH MG: 81 TABLET CHEWABLE at 08:54

## 2020-02-13 RX ADMIN — IPRATROPIUM BROMIDE AND ALBUTEROL SULFATE SCH ML: .5; 3 SOLUTION RESPIRATORY (INHALATION) at 08:51

## 2020-02-13 RX ADMIN — IPRATROPIUM BROMIDE AND ALBUTEROL SULFATE SCH ML: .5; 3 SOLUTION RESPIRATORY (INHALATION) at 11:55

## 2020-02-13 RX ADMIN — GUAIFENESIN PRN MG: 200 SOLUTION ORAL at 05:49

## 2020-02-13 RX ADMIN — LOSARTAN POTASSIUM SCH MG: 50 TABLET, FILM COATED ORAL at 08:54

## 2020-02-13 NOTE — P.PN
Subjective


Progress Note Date: 02/13/20





This is a pleasant 85-year-old  female with known history of coronary 

artery disease and prior bypass surgery, history of prior CVA.  hypertension, 

hyperlipidemia, peripheral vascular disease, follows with Dr. VC Fletcher the 

office.  He presents to the hospital following a syncopal episode.  According to

the patient, she had got up to use the bathroom, she stood up at the sink to 

wash her hands and the next thing she recalled was waking up on the floor.  She 

states that she had no warning prior to passing out, she does not recall if she 

was dizzy or lightheaded, no symptoms prior at all.  Upon wakening she noticed 

that she did lose control of bowel function.  According to the patient, she 

states she's been dealing with a cold over the past couple of weeks.  She does 

state that she has been experiencing some chest pressure and heaviness but 

thought it was secondary to congestion.  Patient also states that she has been 

having some episodes of dizziness.  Patient's home medications include Cozaar 50

mg one tablet by mouth twice a day, Lipitor 20 mg daily, Eliquis 2-1/2 mg one 

tablet by mouth twice a day.  Patient is unsure as to whether or not she's been 

told in the past to have an irregular heartbeat, while the patient is taking 

Eliquis.  CAT scan of the head and cervical spine did not reveal any acute 

fracture or dislocation.  No acute intracranial hemorrhage, mass effect, or 

midline shift.  Chest x-ray shows chronic changes without any acute 

cardiopulmonary process.  EKG shows a normal sinus rhythm with occasional PVC.  

Blood pressure 140/70, heart rate in the 80s.  White blood cell count 7.7 on 

admission, 3.1 this morning, hemoglobin 12.5, platelet count 171.  Sodium 138, 

potassium 3.9, BUN 19, creatinine 0.7.  BNP 5210.  Troponins 0.056, 049, 0.045. 

Positive UTI.  C. diff negative.  At the time of my examination this morning, 

patient complains of feeling weak, she denies any dizziness or lightheadedness, 

complains of mild pressure in the chest.








02/13/2020


Patient was seen and examined this morning, she had no significant orthostatics 

documented.  Echocardiogram with Doppler study revealed an ejection fraction of 

45-50%.  No tachycardia or bradycardia arrhythmias have been noted on the 

monitor.  Blood cell count 6.3, hemoglobin 12.3, platelet count 163.  Sodium 

138, potassium 4.1, BUN 21, creatinine 0.7, magnesium 1.9.








Objective





- Vital Signs


Vital signs: 


                                   Vital Signs











Temp  98.3 F   02/13/20 12:07


 


Pulse  87   02/13/20 12:07


 


Resp  18   02/13/20 12:07


 


BP  125/77   02/13/20 12:07


 


Pulse Ox  94 L  02/13/20 12:07








                                 Intake & Output











 02/12/20 02/13/20 02/13/20





 18:59 06:59 18:59


 


Intake Total 576.152 327.383 305.015


 


Balance 576.152 327.383 305.015


 


Weight 70.8 kg 71.6 kg 


 


Intake:   


 


  IV  160 


 


    0.9  160 


 


  Intake, IV Titration 156.152 167.383 65.015





  Amount   


 


    Heparin Sod,Pork in 0.45% 156.152 7.383 65.015





    NaCl 25,000 unit In 0.45   





    % NaCl 1 250ml.bag @ 12   





    UNITS/KG/HR 8.165 mls/hr   





    IV .Q24H [facility] Rx#:   





    [id number]   


 


    cefTRIAXone 1 gm In  160 





    Sodium Chloride 0.9% 50   





    ml @ 100 mls/hr IVPB   





    Q12HR [facility] Rx#:[id number]   


 


  Oral 420  240


 


Other:   


 


  Voiding Method  Toilet 


 


  # Voids 7 1 3


 


  # Bowel Movements 5  2














- Exam





PHYSICAL EXAMINATION: 





GENERAL: 85-year-old  female in no acute distress at the time of my 

examination





HEENT: Head is atraumatic, normocephalic.  Pupils equal, round.  Sclera 

anicteric. Conjunctiva are clear.  Mucous membranes of the mouth are moist.  

Neck is supple.  There is no elevated jugular venous pressure.  No carotid  

bruit is heard.





HEART EXAMINATION: Heart S1, S2 normal.  No murmur or gallop heard.





CHEST EXAMINATION: Lungs reveal some scattered rhonchi that clear with cough.





ABDOMEN:  Soft, nontender. Bowel sounds are heard. No organomegaly noted.


 


EXTREMITIES: 2+ peripheral pulses with no evidence of peripheral edema and no 

calf tenderness noted.





NEUROLOGIC patient is awake, alert and oriented 3


 





- Labs


CBC & Chem 7: 


                                 02/13/20 02:06





                                 02/13/20 02:06


Labs: 


                  Abnormal Lab Results - Last 24 Hours (Table)











  02/12/20 02/13/20 02/13/20 Range/Units





  17:13 02:06 02:06 


 


APTT  102.8 H*  52.6 H   (22.0-30.0)  sec


 


Chloride    108 H  ()  mmol/L


 


BUN    21 H  (7-17)  mg/dL


 


Glucose    111 H  (74-99)  mg/dL


 


Calcium    7.7 L  (8.4-10.2)  mg/dL














  02/13/20 Range/Units





  08:33 


 


APTT  39.9 H  (22.0-30.0)  sec


 


Chloride   ()  mmol/L


 


BUN   (7-17)  mg/dL


 


Glucose   (74-99)  mg/dL


 


Calcium   (8.4-10.2)  mg/dL








                      Microbiology - Last 24 Hours (Table)











 02/11/20 07:35 Urine Culture - Final





 Urine,Catheterized    Escherichia coli


 


 02/12/20 05:56 Blood Culture - Preliminary





 Blood    No Growth after 24 hours














Assessment and Plan


Plan: 


Assessment and plan


#1 syncope, rule out cardiac causes


#2 abnormality in troponin, no significant rise and fall pattern.  Patient does 

however complain of intermittent chest pressure and heaviness.  EKG shows normal

sinus rhythm with no acute changes.


3 known history of coronary artery disease with prior bypass surgery


#4 hypertension


#5 hyperlipidemia


#6 prior history of smoking


#7 peripheral vascular disease


#8 prior CVA


#9 elevated BNP level with no clear-cut evidence of congestive heart failure





Plan 





From cardiology's perspective, patient may be able to be discharged home today. 

We'll make a follow-up appointment in the office post discharge.


DNP note has been reviewed, I agree with a documented findings and plan of care.

 Patient was seen and examined.

## 2020-02-17 NOTE — CDI
Documentation Clarification Form



Date: 02/17/2020 07:11:06 AM

From: Natalie Dallas

Phone: If you have a question about this query, please contact Cherry Alves 
 at [phone] between 8am and 5pm.

MRN: [id number]

Admit Date: 02/13/2020 08:29:00 AM

Patient Name: Lynne Tripathi

Visit Number: [id number]

Discharge Date:  02/13/2020 01:50:00 PM



ATTENTION: The Clinical Documentation Specialists (CDI) and [facility] Coding Staff 
appreciate your assistance in clarifying documentation. Please respond to the 
clarification below the line at the bottom and electronically sign. The CDI & 
[facility] Coding staff will review the response and follow-up if needed. Please note: 
Queries are made part of the Legal Health Record. If you have any questions, 
please contact the author of this message via ITS.



Dr. Rob Monzon



Possible Type II Myocardial infarction is documented in the 2/12 PN's.  Elevated
troponin level possible Type II MI 

Patient History/Risk Factors:  patient with chest pressure, weakness, 
hypotension, hx tobacco

Clinical Indicators:

EKG  NSR with occational PVC

Troponin:  .056,  .049,   .045

Consult: Cardiology documents chest pressure thought to be due to chest 
congestion.



For accurate documentation please indicate if patient had a  Type II MI or was 
it ruled out



Type Two MI: 

Type II MI ruled out:

Unable to determine

Other Condition, please specify

___________________________________________________________________________

Type Two MI

MTDD

## 2020-02-24 ENCOUNTER — HOSPITAL ENCOUNTER (INPATIENT)
Dept: HOSPITAL 47 - EC | Age: 85
LOS: 3 days | Discharge: HOME HEALTH | DRG: 683 | End: 2020-02-27
Payer: MEDICARE

## 2020-02-24 VITALS — BODY MASS INDEX: 27.4 KG/M2

## 2020-02-24 DIAGNOSIS — Z90.89: ICD-10-CM

## 2020-02-24 DIAGNOSIS — Z95.1: ICD-10-CM

## 2020-02-24 DIAGNOSIS — F41.9: ICD-10-CM

## 2020-02-24 DIAGNOSIS — Z91.048: ICD-10-CM

## 2020-02-24 DIAGNOSIS — Z66: ICD-10-CM

## 2020-02-24 DIAGNOSIS — Z96.612: ICD-10-CM

## 2020-02-24 DIAGNOSIS — F32.1: ICD-10-CM

## 2020-02-24 DIAGNOSIS — M19.90: ICD-10-CM

## 2020-02-24 DIAGNOSIS — I48.91: ICD-10-CM

## 2020-02-24 DIAGNOSIS — Z98.890: ICD-10-CM

## 2020-02-24 DIAGNOSIS — Z80.9: ICD-10-CM

## 2020-02-24 DIAGNOSIS — N17.9: Primary | ICD-10-CM

## 2020-02-24 DIAGNOSIS — H91.90: ICD-10-CM

## 2020-02-24 DIAGNOSIS — I10: ICD-10-CM

## 2020-02-24 DIAGNOSIS — I73.9: ICD-10-CM

## 2020-02-24 DIAGNOSIS — Z86.73: ICD-10-CM

## 2020-02-24 DIAGNOSIS — E78.5: ICD-10-CM

## 2020-02-24 DIAGNOSIS — Z79.01: ICD-10-CM

## 2020-02-24 DIAGNOSIS — I25.10: ICD-10-CM

## 2020-02-24 DIAGNOSIS — Z79.899: ICD-10-CM

## 2020-02-24 DIAGNOSIS — Z90.49: ICD-10-CM

## 2020-02-24 DIAGNOSIS — Z87.891: ICD-10-CM

## 2020-02-24 DIAGNOSIS — G89.29: ICD-10-CM

## 2020-02-24 DIAGNOSIS — Z90.710: ICD-10-CM

## 2020-02-24 DIAGNOSIS — Z88.1: ICD-10-CM

## 2020-02-24 DIAGNOSIS — E86.0: ICD-10-CM

## 2020-02-24 DIAGNOSIS — Z87.11: ICD-10-CM

## 2020-02-24 LAB
ALBUMIN SERPL-MCNC: 3.6 G/DL (ref 3.5–5)
ALP SERPL-CCNC: 87 U/L (ref 38–126)
ALT SERPL-CCNC: 28 U/L (ref 4–34)
ANION GAP SERPL CALC-SCNC: 11 MMOL/L
APTT BLD: 21.3 SEC (ref 22–30)
AST SERPL-CCNC: 35 U/L (ref 14–36)
BASOPHILS # BLD AUTO: 0 K/UL (ref 0–0.2)
BASOPHILS NFR BLD AUTO: 0 %
BUN SERPL-SCNC: 44 MG/DL (ref 7–17)
CALCIUM SPEC-MCNC: 8.4 MG/DL (ref 8.4–10.2)
CHLORIDE SERPL-SCNC: 97 MMOL/L (ref 98–107)
CO2 SERPL-SCNC: 28 MMOL/L (ref 22–30)
EOSINOPHIL # BLD AUTO: 0 K/UL (ref 0–0.7)
EOSINOPHIL NFR BLD AUTO: 0 %
ERYTHROCYTE [DISTWIDTH] IN BLOOD BY AUTOMATED COUNT: 4.56 M/UL (ref 3.8–5.4)
ERYTHROCYTE [DISTWIDTH] IN BLOOD: 13.3 % (ref 11.5–15.5)
GLUCOSE SERPL-MCNC: 169 MG/DL (ref 74–99)
HCT VFR BLD AUTO: 40.4 % (ref 34–46)
HGB BLD-MCNC: 12.9 GM/DL (ref 11.4–16)
INR PPP: 1.1 (ref ?–1.2)
LYMPHOCYTES # SPEC AUTO: 1.1 K/UL (ref 1–4.8)
LYMPHOCYTES NFR SPEC AUTO: 7 %
MAGNESIUM SPEC-SCNC: 2 MG/DL (ref 1.6–2.3)
MCH RBC QN AUTO: 28.2 PG (ref 25–35)
MCHC RBC AUTO-ENTMCNC: 31.9 G/DL (ref 31–37)
MCV RBC AUTO: 88.5 FL (ref 80–100)
MONOCYTES # BLD AUTO: 0.4 K/UL (ref 0–1)
MONOCYTES NFR BLD AUTO: 3 %
NEUTROPHILS # BLD AUTO: 13.5 K/UL (ref 1.3–7.7)
NEUTROPHILS NFR BLD AUTO: 89 %
PLATELET # BLD AUTO: 405 K/UL (ref 150–450)
POTASSIUM SERPL-SCNC: 4 MMOL/L (ref 3.5–5.1)
PROT SERPL-MCNC: 6.1 G/DL (ref 6.3–8.2)
PT BLD: 10.8 SEC (ref 9–12)
SODIUM SERPL-SCNC: 136 MMOL/L (ref 137–145)
WBC # BLD AUTO: 15.1 K/UL (ref 3.8–10.6)

## 2020-02-24 PROCEDURE — 71046 X-RAY EXAM CHEST 2 VIEWS: CPT

## 2020-02-24 PROCEDURE — 96374 THER/PROPH/DIAG INJ IV PUSH: CPT

## 2020-02-24 PROCEDURE — 80053 COMPREHEN METABOLIC PANEL: CPT

## 2020-02-24 PROCEDURE — 93005 ELECTROCARDIOGRAM TRACING: CPT

## 2020-02-24 PROCEDURE — 96361 HYDRATE IV INFUSION ADD-ON: CPT

## 2020-02-24 PROCEDURE — 99285 EMERGENCY DEPT VISIT HI MDM: CPT

## 2020-02-24 PROCEDURE — 85025 COMPLETE CBC W/AUTO DIFF WBC: CPT

## 2020-02-24 PROCEDURE — 83735 ASSAY OF MAGNESIUM: CPT

## 2020-02-24 PROCEDURE — 85730 THROMBOPLASTIN TIME PARTIAL: CPT

## 2020-02-24 PROCEDURE — 84484 ASSAY OF TROPONIN QUANT: CPT

## 2020-02-24 PROCEDURE — 96375 TX/PRO/DX INJ NEW DRUG ADDON: CPT

## 2020-02-24 PROCEDURE — 36415 COLL VENOUS BLD VENIPUNCTURE: CPT

## 2020-02-24 PROCEDURE — 80048 BASIC METABOLIC PNL TOTAL CA: CPT

## 2020-02-24 PROCEDURE — 85610 PROTHROMBIN TIME: CPT

## 2020-02-24 RX ADMIN — CEFAZOLIN SCH MLS/HR: 330 INJECTION, POWDER, FOR SOLUTION INTRAMUSCULAR; INTRAVENOUS at 17:54

## 2020-02-24 NOTE — ED
General Adult HPI





- General


Chief complaint: Recheck/Abnormal Lab/Rx


Stated complaint: chest pain


Time Seen by Provider: 02/24/20 16:24


Source: patient, family, RN notes reviewed, old records reviewed


Mode of arrival: wheelchair


Limitations: no limitations





- History of Present Illness


Initial comments: 





85-year-old female presented for evaluation of right shoulder pain and mid back 

pain.,  This is been ongoing for several weeks.  Patient and her daughter have 

been out of hospitals over the past month with this complaint.  No vomiting.  No

fever.  She was diagnosed with urinary tract infection, he was on antibiotics.  

She received workup for chest pain evaluation on previous admission.  Patient's 

daughter states the rest of physician's office today but her mother could not 

wait secondary to the pain in her right shoulder and mid back.  Patient is a DO 

NOT RESUSCITATE





- Related Data


                                Home Medications











 Medication  Instructions  Recorded  Confirmed


 


Atorvastatin [Lipitor] 20 mg PO HS 01/22/15 02/11/20


 


Apixaban [Eliquis] 2.5 mg PO BID 04/26/17 02/11/20


 


Losartan [Cozaar] 50 mg PO BID 02/11/20 02/11/20








                                  Previous Rx's











 Medication  Instructions  Recorded


 


Cefuroxime Axetil [Ceftin] 500 mg PO BID 3 Days #6 tab 02/13/20











                                    Allergies











Allergy/AdvReac Type Severity Reaction Status Date / Time


 


ciprofloxacin HCl Allergy Mild Itching Verified 02/24/20 16:20





[From Cipro]     


 


adhesive AdvReac Mild Itching Verified 02/24/20 16:20














Review of Systems


ROS Statement: 


Those systems with pertinent positive or pertinent negative responses have been 

documented in the HPI.





ROS Other: All systems not noted in ROS Statement are negative.





Past Medical History


Past Medical History: Coronary Artery Disease (CAD), CVA/TIA, GI Bleed, Hearing 

Disorder / Deafness, Hyperlipidemia, Hypertension, Musculoskeletal Disorder, 

Osteoarthritis (OA)


Additional Past Medical History / Comment(s): PVD.  PEPTIC ULCER DISEASE.  

ULCERATIVE COLITIS.  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 

VISION LT EYE, OCC VISION CAITY & HEARING STROKE.


History of Any Multi-Drug Resistant Organisms: None Reported


Past Surgical History: Appendectomy, Coronary Bypass/CABG, Hysterectomy, Joint 

Replacement, Tonsillectomy


Additional Past Surgical History / Comment(s): Carotid Artery on Left.  

BILATERAL STENTS FOR PVD.  QUAD CABG 1996.  COLONOSCOPY, EGD.  LT SHOULDER 

REPLACEMENT. polyp removed from rectum


Past Anesthesia/Blood Transfusion Reactions: No Reported Reaction


Past Psychological History: No Psychological Hx Reported


Smoking Status: Former smoker


Past Alcohol Use History: None Reported


Past Drug Use History: None Reported





- Past Family History


  ** Sister(s)


Family Medical History: Cancer





  ** Father


Family Medical History: No Reported History


Additional Family Medical History / Comment(s): .





General Exam


Limitations: no limitations


General appearance: alert, in no apparent distress


Head exam: Present: atraumatic, normocephalic


Eye exam: Present: normal appearance, PERRL, EOMI


ENT exam: Present: normal exam


Neck exam: Present: normal inspection.  Absent: tenderness, meningismus


Respiratory exam: Present: normal lung sounds bilaterally.  Absent: respiratory 

distress, wheezes


Cardiovascular Exam: Present: normal rhythm, tachycardia


GI/Abdominal exam: Present: soft.  Absent: distended, tenderness, guarding, 

rebound, rigid


Extremities exam: Present: normal inspection, normal capillary refill, other 

(Distal pulses 2+, symmetric).  Absent: pedal edema


Neurological exam: Present: alert, oriented X3, CN II-XII intact.  Absent: motor

sensory deficit


Psychiatric exam: Present: agitated, anxious


Skin exam: Present: warm, dry, intact.  Absent: cyanosis, diaphoretic





Course


                                   Vital Signs











  02/24/20 02/24/20 02/24/20





  16:20 16:57 17:11


 


Temperature 97.7 F  


 


Pulse Rate 112 H 78 86


 


Respiratory 26 H 16 15





Rate   


 


Blood Pressure 152/111 76/35 95/40


 


O2 Sat by Pulse 96 98 93 L





Oximetry   














  02/24/20 02/24/20





  17:25 17:36


 


Temperature  


 


Pulse Rate  


 


Respiratory  





Rate  


 


Blood Pressure 90/45 109/42


 


O2 Sat by Pulse  





Oximetry  














EKG Findings





- EKG Comments:


EKG Findings:: EKG sinus tachycardia with PVC rate 104, TX interval 124, QRS 

duration 88 , no ST segment elevation





Medical Decision Making





- Medical Decision Making





85-year-old female with repeat ER visit for right shoulder pain.  Patient's 

daughter is concerned about her mother's comfort.  Patient is a DO NOT 

RESUSCITATE.  I discussed the patient's wishes with her daughter.  She really 

just wants her mother to be comfortable, the patient has voiced that she no 

longer wants to live.  She is not suicidal.  Workup in the emergency department 

reveals chest x-ray with cardiomegaly, she has a leukocytosis, no pneumonia on 

x-ray, urinalysis pending.  She has a stable hemoglobin, she has a increasing 

creatinine from baseline.  Troponin negative 1.  At this point I believe the 

patient and her daughters wishes are that she would be comfortable.  We 

discussed the possibility of a hospice consult, and the patient's daughter is 

agreeable with this.  She will be admitted for IV hydration, symptomatic 

treatment, and hospice consult.





- Lab Data


Result diagrams: 


                                 02/24/20 16:13





                                 02/24/20 16:13


                                   Lab Results











  02/24/20 02/24/20 02/24/20 Range/Units





  16:13 16:13 16:13 


 


WBC  15.1 H    (3.8-10.6)  k/uL


 


RBC  4.56    (3.80-5.40)  m/uL


 


Hgb  12.9    (11.4-16.0)  gm/dL


 


Hct  40.4    (34.0-46.0)  %


 


MCV  88.5    (80.0-100.0)  fL


 


MCH  28.2    (25.0-35.0)  pg


 


MCHC  31.9    (31.0-37.0)  g/dL


 


RDW  13.3    (11.5-15.5)  %


 


Plt Count  405  D    (150-450)  k/uL


 


Neutrophils %  89    %


 


Lymphocytes %  7    %


 


Monocytes %  3    %


 


Eosinophils %  0    %


 


Basophils %  0    %


 


Neutrophils #  13.5 H    (1.3-7.7)  k/uL


 


Lymphocytes #  1.1    (1.0-4.8)  k/uL


 


Monocytes #  0.4    (0-1.0)  k/uL


 


Eosinophils #  0.0    (0-0.7)  k/uL


 


Basophils #  0.0    (0-0.2)  k/uL


 


PT    10.8  (9.0-12.0)  sec


 


INR    1.1  (<1.2)  


 


APTT    21.3 L  (22.0-30.0)  sec


 


Sodium   136 L   (137-145)  mmol/L


 


Potassium   4.0   (3.5-5.1)  mmol/L


 


Chloride   97 L   ()  mmol/L


 


Carbon Dioxide   28   (22-30)  mmol/L


 


Anion Gap   11   mmol/L


 


BUN   44 H   (7-17)  mg/dL


 


Creatinine   1.21 H   (0.52-1.04)  mg/dL


 


Est GFR (CKD-EPI)AfAm   47   (>60 ml/min/1.73 sqM)  


 


Est GFR (CKD-EPI)NonAf   41   (>60 ml/min/1.73 sqM)  


 


Glucose   169 H   (74-99)  mg/dL


 


Calcium   8.4   (8.4-10.2)  mg/dL


 


Magnesium   2.0   (1.6-2.3)  mg/dL


 


Total Bilirubin   0.9   (0.2-1.3)  mg/dL


 


AST   35   (14-36)  U/L


 


ALT   28   (4-34)  U/L


 


Alkaline Phosphatase   87   ()  U/L


 


Troponin I     (0.000-0.034)  ng/mL


 


Total Protein   6.1 L   (6.3-8.2)  g/dL


 


Albumin   3.6   (3.5-5.0)  g/dL














  02/24/20 Range/Units





  16:13 


 


WBC   (3.8-10.6)  k/uL


 


RBC   (3.80-5.40)  m/uL


 


Hgb   (11.4-16.0)  gm/dL


 


Hct   (34.0-46.0)  %


 


MCV   (80.0-100.0)  fL


 


MCH   (25.0-35.0)  pg


 


MCHC   (31.0-37.0)  g/dL


 


RDW   (11.5-15.5)  %


 


Plt Count   (150-450)  k/uL


 


Neutrophils %   %


 


Lymphocytes %   %


 


Monocytes %   %


 


Eosinophils %   %


 


Basophils %   %


 


Neutrophils #   (1.3-7.7)  k/uL


 


Lymphocytes #   (1.0-4.8)  k/uL


 


Monocytes #   (0-1.0)  k/uL


 


Eosinophils #   (0-0.7)  k/uL


 


Basophils #   (0-0.2)  k/uL


 


PT   (9.0-12.0)  sec


 


INR   (<1.2)  


 


APTT   (22.0-30.0)  sec


 


Sodium   (137-145)  mmol/L


 


Potassium   (3.5-5.1)  mmol/L


 


Chloride   ()  mmol/L


 


Carbon Dioxide   (22-30)  mmol/L


 


Anion Gap   mmol/L


 


BUN   (7-17)  mg/dL


 


Creatinine   (0.52-1.04)  mg/dL


 


Est GFR (CKD-EPI)AfAm   (>60 ml/min/1.73 sqM)  


 


Est GFR (CKD-EPI)NonAf   (>60 ml/min/1.73 sqM)  


 


Glucose   (74-99)  mg/dL


 


Calcium   (8.4-10.2)  mg/dL


 


Magnesium   (1.6-2.3)  mg/dL


 


Total Bilirubin   (0.2-1.3)  mg/dL


 


AST   (14-36)  U/L


 


ALT   (4-34)  U/L


 


Alkaline Phosphatase   ()  U/L


 


Troponin I  0.025  (0.000-0.034)  ng/mL


 


Total Protein   (6.3-8.2)  g/dL


 


Albumin   (3.5-5.0)  g/dL














Disposition


Clinical Impression: 


 UTI (urinary tract infection), Chronic pain, Dehydration, MOODY (acute kidney 

injury)





Disposition: ADMITTED AS IP TO THIS [facility]


Condition: Stable


Is patient prescribed a controlled substance at d/c from ED?: No


Referrals: 


Javier Mancuso MD [Primary Care Provider] - 1-2 days


Decision to Admit Reason: Admit from EC


Decision Date: 02/24/20


Decision Time: 17:46

## 2020-02-24 NOTE — XR
EXAMINATION TYPE: XR chest 2V

 

DATE OF EXAM: 2/24/2020

 

COMPARISON: Chest x-ray February 11, 2010.

 

HISTORY: Worsening chest pain.

 

TECHNIQUE:  Frontal and lateral views of the chest are obtained.

 

FINDINGS:  There is chronic parenchymal change without suspicious new focal air space opacity, pleura
l effusion, or pneumothorax seen.  The cardiac silhouette size remains enlarged with atherosclerotic 
and ectatic thoracic aorta redemonstrated.   Overlying sternal wires and mediastinal clips again seen
. The osseous structures remain demineralized. Partial visualization surgical change left shoulder le
brisa.

 

IMPRESSION:  Cardiomegaly and chronic changes without new acute pulmonary process.

## 2020-02-25 RX ADMIN — CITALOPRAM HYDROBROMIDE SCH MG: 10 TABLET ORAL at 13:40

## 2020-02-25 RX ADMIN — CEFAZOLIN SCH: 330 INJECTION, POWDER, FOR SOLUTION INTRAMUSCULAR; INTRAVENOUS at 08:51

## 2020-02-25 RX ADMIN — APIXABAN SCH MG: 2.5 TABLET, FILM COATED ORAL at 21:20

## 2020-02-25 RX ADMIN — FAMOTIDINE SCH MG: 20 TABLET, FILM COATED ORAL at 21:20

## 2020-02-25 NOTE — P.HPIM
History of Present Illness


84-year-old pleasant female was brought in with multiple nonspecific complaints 

as per the daughter patient was having an throat pain patient also comparing of 

shoulder pain back pain and chest pain.  Patient was extensively evaluated 

during his recent hospital and patient is a concurrent syndromes.  Patient chest

pain is musculoskeletal nature appears to be coming from the back constant pain 

patient appears to be mostly depressed and she believes her daughter will be 

 and doesn't want to live anymore 1 the doctors to give her something so 

that she will diet patient admits to being severely depressed, she would kill he

rself if he she knows how to do it means to do it.  Her main issues appears to 

be mainly depression age-related.  Patient has good memory patient and family 

wanted the to be comfort care and hospice only although patient is obviously 

depressed.  Patient is bit dehydrated probably secondary to posttraumatic I'm 

holding off on the ACE inhibitor and patient will be started on IV fluids, 

psychiatric will be consulted along with one-on-one sitter





Review of Systems











REVIEW OF SYSTEMS: 


CONSTITUTIONAL: No fever, no malaise, no fatigue. 


HEENT: No recent visual problems or hearing problems. Denied any sore throat. 


CARDIOVASCULAR: No  orthopnea, PND, no palpitations, no syncope. 


PULMONARY: No shortness of breath, no cough, no hemoptysis. 


GASTROINTESTINAL: No diarrhea, no nausea, no vomiting, no abdominal pain. 


NEUROLOGICAL: No headaches, no weakness, no numbness. 


HEMATOLOGICAL: Denies any bleeding or petechiae. 


GENITOURINARY: Denies any burning micturition, frequency, or urgency. 


MUSCULOSKELETAL/RHEUMATOLOGICAL: As mentioned above


ENDOCRINE: Denies any polyuria or polydipsia. 





The rest of the 14-point review of systems is negative.








Past Medical History


Past Medical History: Coronary Artery Disease (CAD), Chest Pain / Angina, 

CVA/TIA, GI Bleed, Hearing Disorder / Deafness, Hyperlipidemia, Hypertension, 

Musculoskeletal Disorder, Osteoarthritis (OA)


Additional Past Medical History / Comment(s): PVD.  PEPTIC ULCER DISEASE.  

ULCERATIVE COLITIS.  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 

VISION LT EYE, OCC VISION CAITY & HEARING STROKE.


History of Any Multi-Drug Resistant Organisms: None Reported


Past Surgical History: Appendectomy, Coronary Bypass/CABG, Heart 

Catheterization, Hysterectomy, Joint Replacement, Tonsillectomy


Additional Past Surgical History / Comment(s): Carotid Artery on Left.  

BILATERAL STENTS FOR PVD.  QUAD CABG 1996.  COLONOSCOPY, EGD.  LT SHOULDER 

REPLACEMENT. polyp removed from rectum


Past Anesthesia/Blood Transfusion Reactions: No Reported Reaction


Past Psychological History: Anxiety, Depression


Smoking Status: Former smoker


Past Alcohol Use History: None Reported


Additional Past Alcohol Use History / Comment(s): SMOKED FOR 40 YEARS., QUIT IN 

1992.  STATES 1 PACK COULD LAST 1 MONTH.


Past Drug Use History: None Reported





- Past Family History


  ** Sister(s)


Family Medical History: Cancer





  ** Father


Family Medical History: No Reported History


Additional Family Medical History / Comment(s): .





Medications and Allergies


                                Home Medications











 Medication  Instructions  Recorded  Confirmed  Type


 


Atorvastatin [Lipitor] 20 mg PO DAILY 01/22/15 02/24/20 History


 


Apixaban [Eliquis] 2.5 mg PO BID 04/26/17 02/24/20 History


 


Losartan [Cozaar] 50 mg PO BID 02/11/20 02/24/20 History


 


Famotidine [Pepcid AC] 10 mg PO BID 02/24/20 02/24/20 History


 


Furosemide [Lasix] 40 mg PO DAILY 02/24/20 02/24/20 History


 


predniSONE See Taper PO DAILY 02/24/20 02/24/20 History








                                    Allergies











Allergy/AdvReac Type Severity Reaction Status Date / Time


 


adhesive Allergy Mild Rash/Hives Verified 02/24/20 18:02


 


ciprofloxacin HCl Allergy Mild Rash/Hives Verified 02/24/20 18:02





[From Cipro]     














Physical Exam


Vitals: 


                                   Vital Signs











  Temp Pulse Pulse Resp BP BP Pulse Ox


 


 02/25/20 05:22  97.5 F L   67  18   145/67  97


 


 02/24/20 20:55  97.4 F L   63  18   130/62  97


 


 02/24/20 19:29  97.8 F  69   16  106/57   96


 


 02/24/20 18:19   68   16  130/41   95


 


 02/24/20 17:36      109/42  


 


 02/24/20 17:25      90/45  


 


 02/24/20 17:11   86   15  95/40   93 L


 


 02/24/20 16:57   78   16  76/35   98


 


 02/24/20 16:20  97.7 F  112 H   26 H  152/111   96








                                Intake and Output











 02/24/20 02/25/20 02/25/20





 22:59 06:59 14:59


 


Intake Total   240


 


Balance   240


 


Intake:   


 


  Oral   240


 


Other:   


 


  Voiding Method  Toilet 





  Incontinent 


 


  # Voids 1 1 


 


  Weight 70.307 kg  70.307 kg

















PHYSICAL EXAMINATION: 





GENERAL: The patient is alert and oriented x3, not in any acute distress. Well 

developed, well nourished.  She is obviously depressed and tearful appears to be

more concerned that she is more of a burden for her daughter


HEENT: Pupils are round and equally reacting to light. EOMI. No scleral icterus.

No conjunctival pallor. Normocephalic, atraumatic. No pharyngeal erythema. No 

thyromegaly. 


CARDIOVASCULAR: S1 and S2 present. No murmurs, rubs, or gallops. 


PULMONARY: Chest is clear to auscultation, no wheezing or crackles. 


ABDOMEN: Soft, nontender, nondistended, normoactive bowel sounds. No palpable 

organomegaly. 


MUSCULOSKELETAL: No joint swelling or deformity.


EXTREMITIES: No cyanosis, clubbing, or pedal edema. 


NEUROLOGICAL: Gross neurological examination did not reveal any focal deficits. 


SKIN: No rashes. 

















Results


CBC & Chem 7: 


                                 02/24/20 16:13





                                 02/24/20 16:13


Labs: 


                  Abnormal Lab Results - Last 24 Hours (Table)











  02/24/20 02/24/20 02/24/20 Range/Units





  16:13 16:13 16:13 


 


WBC  15.1 H    (3.8-10.6)  k/uL


 


Neutrophils #  13.5 H    (1.3-7.7)  k/uL


 


APTT    21.3 L  (22.0-30.0)  sec


 


Sodium   136 L   (137-145)  mmol/L


 


Chloride   97 L   ()  mmol/L


 


BUN   44 H   (7-17)  mg/dL


 


Creatinine   1.21 H   (0.52-1.04)  mg/dL


 


Glucose   169 H   (74-99)  mg/dL


 


Total Protein   6.1 L   (6.3-8.2)  g/dL














Thrombosis Risk Factor Assmnt





- Choose All That Apply


Any of the Below Risk Factors Present?: Yes


Each Factor Represents 1 point: Obesity (BMI >25)


Other Risk Factors: Yes


Each Risk Factor Represents 3 Points: Age 75 years or older


Other congenital or acquired thrombophilia - If yes, enter type in comment: No


Thrombosis Risk Factor Assessment Total Risk Factor Score: 4


Thrombosis Risk Factor Assessment Level: Moderate Risk





Assessment and Plan


Plan: 


-Acute renal failure probably 70 to poor fair oral intake patient will be 

started on IV fluids as mentioned overly above we'll repeat basic metabolic 

profile again tomorrow.


-Chest pain musculoskeletal secondary to severe osteoarthritis and back pain 

which is radiating to his chest and part of her pain complaints may be related 

to her depression to.  Repeat 2 more sets of troponins and an EKG patient was a 

extensive evaluated during her recent hospital physician for coronary artery 

disease.


-Major depression an elderly: Patient will be started on citalopram as patient 

the is severely depressed and constant his psychiatric and the also sitter 

because of her suicidal ideations as mentioned above


-Leukocytosis reactive no evidence of urinary tract infection or any other 

infection


-CVAT in the past patient is on Eliquis I believe is secondary to atrial 

fibrillation which will be resumed patient is sinus rhythm rate controlled at 

this time


-Hyperlipidemia


-Hypertension

## 2020-02-26 VITALS — RESPIRATION RATE: 20 BRPM

## 2020-02-26 LAB
ANION GAP SERPL CALC-SCNC: 5 MMOL/L
BUN SERPL-SCNC: 21 MG/DL (ref 7–17)
CALCIUM SPEC-MCNC: 7.8 MG/DL (ref 8.4–10.2)
CHLORIDE SERPL-SCNC: 108 MMOL/L (ref 98–107)
CO2 SERPL-SCNC: 23 MMOL/L (ref 22–30)
GLUCOSE SERPL-MCNC: 124 MG/DL (ref 74–99)
POTASSIUM SERPL-SCNC: 3.9 MMOL/L (ref 3.5–5.1)
SODIUM SERPL-SCNC: 136 MMOL/L (ref 137–145)

## 2020-02-26 RX ADMIN — APIXABAN SCH MG: 2.5 TABLET, FILM COATED ORAL at 20:55

## 2020-02-26 RX ADMIN — APIXABAN SCH MG: 2.5 TABLET, FILM COATED ORAL at 08:20

## 2020-02-26 RX ADMIN — FAMOTIDINE SCH MG: 20 TABLET, FILM COATED ORAL at 20:55

## 2020-02-26 RX ADMIN — ATORVASTATIN CALCIUM SCH MG: 20 TABLET, FILM COATED ORAL at 08:20

## 2020-02-26 RX ADMIN — CITALOPRAM HYDROBROMIDE SCH MG: 10 TABLET ORAL at 08:20

## 2020-02-26 RX ADMIN — FAMOTIDINE SCH MG: 20 TABLET, FILM COATED ORAL at 08:20

## 2020-02-26 RX ADMIN — CEFAZOLIN SCH MLS/HR: 330 INJECTION, POWDER, FOR SOLUTION INTRAMUSCULAR; INTRAVENOUS at 15:16

## 2020-02-26 RX ADMIN — CEFAZOLIN SCH MLS/HR: 330 INJECTION, POWDER, FOR SOLUTION INTRAMUSCULAR; INTRAVENOUS at 05:26

## 2020-02-26 NOTE — P.CN
Psychiatric Consult





- .


Consult date: 20


Consult:: 


IDENTIFYING DATA: She is  an 85-year-old   female admitted to 

medicine service with multiple nonspecific complaints including shoulder pain, 

back pain and chest pain.  She is known to medicine service from prior admi

ssions.  She was discharged from medicine on 2020.  The hospitalist 

submitted a consult to psychiatry because she appears depressed and expresses 

feelings of hopelessness and helplessness. 





HISTORY OF PRESENT ILLNESS: I reviewed the medical record, interviewed the 

patient and spoke with her daughter, Imani.  She feels bored and useless and 

does not feel as though her life is worthwhile.  Several times during interview 

she expressed a wish that she wished she were dead or that someone would give 

her something told that she would die.  She described persistent feelings of 

depression, worthlessness, hopelessness and helplessness.  She has s felt 

depressed since the death of her  2 years ago and talked about 

experiencing other losses that year including the death of her sister and 

daughter-in-law.  She is no longer able to engage in many former activities 

including sewing and reading.





Her daughter reported that she was an avid reader up until a month ago.  He 

complains of being bored.  Since her CVA she had difficulty following television

shows and is recently only watched game shows.  She reports that she has no 

enjoyment in life and sees no future for herself.  She feels that her memory is 

worse than others and more impaired since her CVA..  She has a general sense of 

tension but denied periods of increased anxiety consistent with panic attack.  

She denied experiencing obsessions or compulsions.  She denied such psychotic 

symptoms as auditory, visual or olfactory hallucinations, ideas reference etc.  

She denied experiencing periods of confusion.





Her daughter is concerned by her general withdrawal and talk of death and 

suicide.  She has been depressed since the death of her  2 years ago, but

depression has worsened over the last few months.  Her daughter stated that 

towards the end of his life her father was asked to be "put out of his misery" 

and was talking about having some "end his life".





Patient denied current suicide intent or plan.  She denied history of suicide 

attempts or gestures.  She does not drink and has never used drugs on her own to

get high, help her sleep or change her mood.





We completed the Geriatric Depression Inventory her total score was 11 

consistent with moderately severe depression.





PAST PSYCHIATRIC HISTORY: Denied.  Her daughter stated she refused her primary's

recommendation for an antidepressant





PAST MEDICAL HISTORY: She has mental multiple medical problems including 

coronary artery disease, history of CVA, GI bleed, hearing loss, hyperlipidemia,

hypertension, chronic back pain, osteoarthritis, ulcerative colitis, 

appendectomy, coronary bypass, heart catheterization, hysterectomy, and joint 

replacement.. 





ALLERGIES: Ciprofloxacin. 





SUBSTANCE USE HISTORY: Denied. 





FAMILY PSYCHIATRIC/SUBSTANCE USE HISTORY: She is unaware of family history of 

mental illness. 





SOCIAL HISTORY: She was  for 62 years.    2 years ago.  She 

has 3 children.  She lives with her daughter.





MENTAL STATUS EXAM: She presented as a pale and frail appearing elderly woman 

who was sitting in a recliner.  She made eye contact and attended to interview. 

She had a wet cough intermittently during the interview.  She had a hearing 

impairment.  She had no prominent physical abnormalities.  She had a depressed 

facial expression.  She was alert and oriented to person, place and time.  She 

showed psychomotor retardation but no abnormal movements.  I did not evaluate 

her gait.  Her speech was spontaneous with decreased rate and volume.  Her 

affect was depressed and unreactive.  She describes suicidal ideation and death 

wishes.  She denied homicidal ideation.  She expressed feelings of hopelessness,

helplessness and worthlessness.  She ruminated about her physical impairment in 

multiple medical problems.  She didn't express ideas reference, paranoid 

ideation or delusions.  Her thinking was abstract and associations were 

coherent, logical and goal directed.  She denied hallucinations did not appear 

to be responding to internal stimuli.





We completed the [facility] Orientation Memory and Concentration test.  Her total 

weighted error score was 9; total weighted error score greater than 11 is 

consistent with dementia.  She knew the month and year.  She was able to 

register memory phrase "Luis Enrique Lazo, [address] [place] [address]."  She was unable to 

estimate the time within 1 hour to correct time.  She is able to count backwards

from 21 but had difficulty naming the months the year backwards beginning with 

December.  She recalled 3 elements of memory phrase.





IMPRESSIONS: She is a  85-year-old woman with multiple medical problems 

and presented to the hospital with vague medical complaints, suicidal ideation 

and death wishes.  She has signs and symptoms of a major depressive disorder 

including persistent feelings of hopelessness, helplessness, worthlessness, 

decreased energy and impaired concentration, anhedonia and fatigue.  She has 

passive suicidal ideation and death wishes without intent or plan.  Is no 

evidence of psychosis.  At the [facility] depression is worsened with her failing 

health.  There is no indication for transfer to the psychiatric unit at this 

time but she would benefit from outpatient mental health treatment.





DIAGNOSIS: Major depressive disorder moderate single episode





PLAN: Discontinue one-to-one.  Continue citalopram 10 mg daily and titrated 

according to clinical response and tolerance.   Refer her for outpatient mental 

health treatment preferably with a geropsychiatrist if one is available.  Thank 

you for the consult.  Will follow.


20 11:48





20 12:15

## 2020-02-27 VITALS — TEMPERATURE: 98.3 F | DIASTOLIC BLOOD PRESSURE: 67 MMHG | SYSTOLIC BLOOD PRESSURE: 146 MMHG | HEART RATE: 70 BPM

## 2020-02-27 RX ADMIN — APIXABAN SCH MG: 2.5 TABLET, FILM COATED ORAL at 08:09

## 2020-02-27 RX ADMIN — CEFAZOLIN SCH MLS/HR: 330 INJECTION, POWDER, FOR SOLUTION INTRAMUSCULAR; INTRAVENOUS at 00:08

## 2020-02-27 RX ADMIN — CITALOPRAM HYDROBROMIDE SCH MG: 10 TABLET ORAL at 08:09

## 2020-02-27 RX ADMIN — FAMOTIDINE SCH MG: 20 TABLET, FILM COATED ORAL at 08:09

## 2020-02-27 RX ADMIN — CEFAZOLIN SCH MLS/HR: 330 INJECTION, POWDER, FOR SOLUTION INTRAMUSCULAR; INTRAVENOUS at 08:12

## 2020-02-27 RX ADMIN — ATORVASTATIN CALCIUM SCH MG: 20 TABLET, FILM COATED ORAL at 08:09

## 2020-02-27 NOTE — P.DS
Providers


Date of admission: 


02/26/20 08:33





Expected date of discharge: 02/27/20


Attending physician: 


Michael Figueroa MD





Consults: 





                                        





02/25/20 12:13


Consult Physician Routine 


   Consulting Provider: Luis Enrique Katz


   Consult Reason/Comments: depression


   Do you want consulting provider notified?: Yes











Primary care physician: 


[facility]





[facility] Course: 





Final diagnosis





-Acute renal failure probably secondary to poor oral intake.  


-Chest pain musculoskeletal secondary to severe osteoarthritis and back pain 


-Major depression in elderly


-Leukocytosis reactive no evidence of urinary tract infection or any other 

infection


-CVA in the past 


-Hyperlipidemia


-Hypertension





Discharge disposition


Patient is being discharged in a stable condition with guarded prognosis to home

and will follow-up with Dr. Mancuso in the outpatient setting upon discharge.  

Patient will also need to follow-up with possible geriatric psych in the 

outpatient setting.  She will continue with home care with the [facility] visiting 

nurses.  Total time taken is 35 minutes.





History of present illness


84-year-old pleasant female was brought in with multiple nonspecific complaints 

as per the daughter patient was having an throat pain patient also comparing of 

shoulder pain back pain and chest pain.  Patient was extensively evaluated 

during his recent hospital and patient is a concurrent syndromes.  Patient chest

pain is musculoskeletal nature appears to be coming from the back constant pain 

patient appears to be mostly depressed and she believes her daughter will be 

 and doesn't want to live anymore 1 the doctors to give her something so 

that she will die patient admits to being severely depressed, she would kill 

herself if he she knows how to do it means to do it.  Her main issues appears to

be mainly depression age-related.  Patient has good memory patient and family 

wanted the to be comfort care and hospice only although patient is obviously 

depressed.  Patient is bit dehydrated probably secondary to posttraumatic I'm 

holding off on the ACE inhibitor and patient will be started on IV fluids, 

psychiatric will be consulted along with one-on-one sitter.





02/27/2020


She was seen and evaluated by psychiatry recommending outpatient follow-up with 

possible geriatric psychiatry facility along with continuing citalopram 10 mg 

daily.  Patient instructed to follow-up with primary care provider Dr. Mancuso in 

the outpatient setting upon discharge.  Discussed with the patient at length 

about encouraging oral intake and patient verbalizes understanding.  Patient 

will continue with outpatient home care is her and her daughter are agreeable to

now.  [facility] visiting nurses will be following.  Currently no reports of chest 

pain, shortness of breath, or palpitations.  Patient is afebrile.  No reports of

nausea or vomiting.  Patient currently denying suicidal thoughts although 

continues to be severely depressed.





On exam vital signs are stable.  Temp is 98.3F, pulse is 70, respirations are 

20, blood pressure 146/67, oxygen saturation is 92% on room air.  Cardio S1, S2 

are present.  Respiratory is clear to auscultation.  Abdomen is soft and 

nontender.  Nervous system shows no focal deficits.





Please refer to medication reconciliation sheet for a list of medications.





Patient Condition at Discharge: Stable





Plan - Discharge Summary


New Discharge Prescriptions: 


New


   Citalopram Hydrobromide [CeleXA] 10 mg PO DAILY 30 Days #30 tab


   traMADol HCl [Ultram] 50 mg PO QID PRN #16 tab


     PRN Reason: Pain/Discomfort





Continue


   Atorvastatin [Lipitor] 20 mg PO DAILY


   Apixaban [Eliquis] 2.5 mg PO BID


   Losartan [Cozaar] 50 mg PO BID


   Famotidine [Pepcid AC] 10 mg PO BID





Discontinued


   predniSONE See Taper PO DAILY


   Furosemide [Lasix] 40 mg PO DAILY


Discharge Medication List





Atorvastatin [Lipitor] 20 mg PO DAILY 01/22/15 [History]


Apixaban [Eliquis] 2.5 mg PO BID 04/26/17 [History]


Losartan [Cozaar] 50 mg PO BID 02/11/20 [History]


Famotidine [Pepcid AC] 10 mg PO BID 02/24/20 [History]


Citalopram Hydrobromide [CeleXA] 10 mg PO DAILY 30 Days #30 tab 02/27/20 [Rx]


traMADol HCl [Ultram] 50 mg PO QID PRN #16 tab 02/27/20 [Rx]








Follow up Appointment(s)/Referral(s): 


Javier Mancuso MD [Primary Care Provider] - 03/02/20 1:00 pm


[facility] Visiting Nurse, [NON-STAFF] - 


Patient Instructions/Handouts:  Depression (DC)


Activity/Diet/Wound Care/Special Instructions: 


Activity Limited until follow-up


Follow-up with primary care provider upon discharge


Follow-up with geriatric psychiatrist in the outpatient setting upon discharge-

literature given


Continue with citalopram


Encourage oral intake of meals and fluids


Continue to hold Lasix until follow-up with primary care provider





Discharge Disposition: HOME WITH HOME HEALTH SERVICES

## 2020-03-11 ENCOUNTER — HOSPITAL ENCOUNTER (OUTPATIENT)
Dept: HOSPITAL 47 - LABWHC1 | Age: 85
Discharge: HOME | End: 2020-03-11
Attending: NURSE PRACTITIONER
Payer: MEDICARE

## 2020-03-11 DIAGNOSIS — K51.90: Primary | ICD-10-CM

## 2020-03-11 LAB
BASOPHILS # BLD AUTO: 0 K/UL (ref 0–0.2)
BASOPHILS NFR BLD AUTO: 1 %
EOSINOPHIL # BLD AUTO: 0.2 K/UL (ref 0–0.7)
EOSINOPHIL NFR BLD AUTO: 3 %
ERYTHROCYTE [DISTWIDTH] IN BLOOD BY AUTOMATED COUNT: 4.14 M/UL (ref 3.8–5.4)
ERYTHROCYTE [DISTWIDTH] IN BLOOD: 14.3 % (ref 11.5–15.5)
HCT VFR BLD AUTO: 37.8 % (ref 34–46)
HGB BLD-MCNC: 11.5 GM/DL (ref 11.4–16)
LYMPHOCYTES # SPEC AUTO: 1.2 K/UL (ref 1–4.8)
LYMPHOCYTES NFR SPEC AUTO: 17 %
MCH RBC QN AUTO: 27.9 PG (ref 25–35)
MCHC RBC AUTO-ENTMCNC: 30.5 G/DL (ref 31–37)
MCV RBC AUTO: 91.4 FL (ref 80–100)
MONOCYTES # BLD AUTO: 0.4 K/UL (ref 0–1)
MONOCYTES NFR BLD AUTO: 5 %
NEUTROPHILS # BLD AUTO: 5.2 K/UL (ref 1.3–7.7)
NEUTROPHILS NFR BLD AUTO: 72 %
PLATELET # BLD AUTO: 218 K/UL (ref 150–450)
WBC # BLD AUTO: 7.2 K/UL (ref 3.8–10.6)

## 2020-03-11 PROCEDURE — 86140 C-REACTIVE PROTEIN: CPT

## 2020-03-11 PROCEDURE — 36415 COLL VENOUS BLD VENIPUNCTURE: CPT

## 2020-03-11 PROCEDURE — 85025 COMPLETE CBC W/AUTO DIFF WBC: CPT

## 2020-07-29 ENCOUNTER — HOSPITAL ENCOUNTER (OUTPATIENT)
Dept: HOSPITAL 47 - RADXRMAIN | Age: 85
Discharge: HOME | End: 2020-07-29
Attending: PHYSICAL MEDICINE & REHABILITATION
Payer: MEDICARE

## 2020-07-29 DIAGNOSIS — Z96.612: Primary | ICD-10-CM

## 2020-07-29 PROCEDURE — 71046 X-RAY EXAM CHEST 2 VIEWS: CPT

## 2020-07-29 NOTE — XR
EXAMINATION TYPE: XR chest 2V

 

DATE OF EXAM: 7/29/2020

 

CLINICAL HISTORY: MRI clearance 

 

TECHNIQUE:  Frontal and lateral views of the chest are obtained.

 

COMPARISON: Chest radiograph 2/24/2020

 

FINDINGS:  Sternotomy wires. Left shoulder arthroplasty hardware incompletely visualized. Left medias
tinal surgical clips. The cardiomediastinal silhouette is within normal limits for size. There is junior
cification and ectasia of the thoracic aorta redemonstrated. Pulmonary vasculature is normal. There i
s no focal air space opacity, pleural effusion, or pneumothorax seen. The osseous structures are inta
ct.

 

IMPRESSION:  

 

1. No acute cardiopulmonary process. 

2. Sternotomy wires, mediastinal surgical clips, and left shoulder arthroplasty hardware.

## 2020-10-05 ENCOUNTER — HOSPITAL ENCOUNTER (INPATIENT)
Dept: HOSPITAL 47 - EC | Age: 85
DRG: 283 | End: 2020-10-05
Attending: HOSPITALIST | Admitting: HOSPITALIST
Payer: MEDICARE

## 2020-10-05 VITALS — RESPIRATION RATE: 20 BRPM

## 2020-10-05 VITALS — DIASTOLIC BLOOD PRESSURE: 48 MMHG | SYSTOLIC BLOOD PRESSURE: 111 MMHG | HEART RATE: 99 BPM

## 2020-10-05 VITALS — TEMPERATURE: 98.3 F

## 2020-10-05 DIAGNOSIS — Z87.891: ICD-10-CM

## 2020-10-05 DIAGNOSIS — H53.9: ICD-10-CM

## 2020-10-05 DIAGNOSIS — R00.1: ICD-10-CM

## 2020-10-05 DIAGNOSIS — Z79.899: ICD-10-CM

## 2020-10-05 DIAGNOSIS — Z95.1: ICD-10-CM

## 2020-10-05 DIAGNOSIS — Z96.1: ICD-10-CM

## 2020-10-05 DIAGNOSIS — I48.91: ICD-10-CM

## 2020-10-05 DIAGNOSIS — I27.20: ICD-10-CM

## 2020-10-05 DIAGNOSIS — Z90.710: ICD-10-CM

## 2020-10-05 DIAGNOSIS — E66.9: ICD-10-CM

## 2020-10-05 DIAGNOSIS — Z90.89: ICD-10-CM

## 2020-10-05 DIAGNOSIS — H91.90: ICD-10-CM

## 2020-10-05 DIAGNOSIS — M19.90: ICD-10-CM

## 2020-10-05 DIAGNOSIS — I46.9: ICD-10-CM

## 2020-10-05 DIAGNOSIS — Z88.6: ICD-10-CM

## 2020-10-05 DIAGNOSIS — I69.398: ICD-10-CM

## 2020-10-05 DIAGNOSIS — Z98.41: ICD-10-CM

## 2020-10-05 DIAGNOSIS — I11.0: ICD-10-CM

## 2020-10-05 DIAGNOSIS — I21.09: ICD-10-CM

## 2020-10-05 DIAGNOSIS — Z98.42: ICD-10-CM

## 2020-10-05 DIAGNOSIS — F41.9: ICD-10-CM

## 2020-10-05 DIAGNOSIS — Z79.02: ICD-10-CM

## 2020-10-05 DIAGNOSIS — I50.23: ICD-10-CM

## 2020-10-05 DIAGNOSIS — I73.9: ICD-10-CM

## 2020-10-05 DIAGNOSIS — I22.2: Primary | ICD-10-CM

## 2020-10-05 DIAGNOSIS — Z66: ICD-10-CM

## 2020-10-05 DIAGNOSIS — Z80.9: ICD-10-CM

## 2020-10-05 DIAGNOSIS — F32.9: ICD-10-CM

## 2020-10-05 DIAGNOSIS — Z87.01: ICD-10-CM

## 2020-10-05 DIAGNOSIS — Z87.11: ICD-10-CM

## 2020-10-05 DIAGNOSIS — Z83.3: ICD-10-CM

## 2020-10-05 DIAGNOSIS — Z96.612: ICD-10-CM

## 2020-10-05 DIAGNOSIS — I71.4: ICD-10-CM

## 2020-10-05 DIAGNOSIS — E78.5: ICD-10-CM

## 2020-10-05 DIAGNOSIS — Z79.01: ICD-10-CM

## 2020-10-05 DIAGNOSIS — A04.72: ICD-10-CM

## 2020-10-05 DIAGNOSIS — E87.2: ICD-10-CM

## 2020-10-05 DIAGNOSIS — Z79.82: ICD-10-CM

## 2020-10-05 DIAGNOSIS — Z95.828: ICD-10-CM

## 2020-10-05 DIAGNOSIS — K51.90: ICD-10-CM

## 2020-10-05 DIAGNOSIS — I25.10: ICD-10-CM

## 2020-10-05 DIAGNOSIS — Z87.19: ICD-10-CM

## 2020-10-05 LAB
ALBUMIN SERPL-MCNC: 3.1 G/DL (ref 3.5–5)
ALP SERPL-CCNC: 56 U/L (ref 38–126)
ALT SERPL-CCNC: 15 U/L (ref 4–34)
ANION GAP SERPL CALC-SCNC: 8 MMOL/L
APTT BLD: 23.4 SEC (ref 22–30)
AST SERPL-CCNC: 25 U/L (ref 14–36)
BASOPHILS # BLD AUTO: 0.1 K/UL (ref 0–0.2)
BASOPHILS NFR BLD AUTO: 0 %
BUN SERPL-SCNC: 21 MG/DL (ref 7–17)
CALCIUM SPEC-MCNC: 8.2 MG/DL (ref 8.4–10.2)
CHLORIDE SERPL-SCNC: 106 MMOL/L (ref 98–107)
CO2 SERPL-SCNC: 20 MMOL/L (ref 22–30)
EOSINOPHIL # BLD AUTO: 0.4 K/UL (ref 0–0.7)
EOSINOPHIL NFR BLD AUTO: 1 %
ERYTHROCYTE [DISTWIDTH] IN BLOOD BY AUTOMATED COUNT: 4.22 M/UL (ref 3.8–5.4)
ERYTHROCYTE [DISTWIDTH] IN BLOOD: 13.6 % (ref 11.5–15.5)
GLUCOSE SERPL-MCNC: 169 MG/DL (ref 74–99)
HCT VFR BLD AUTO: 38.9 % (ref 34–46)
HGB BLD-MCNC: 11.7 GM/DL (ref 11.4–16)
INR PPP: 1.1 (ref ?–1.2)
LIPASE SERPL-CCNC: 15 U/L (ref 23–300)
LYMPHOCYTES # SPEC AUTO: 0.2 K/UL (ref 1–4.8)
LYMPHOCYTES NFR SPEC AUTO: 1 %
MAGNESIUM SPEC-SCNC: 1.7 MG/DL (ref 1.6–2.3)
MCH RBC QN AUTO: 27.7 PG (ref 25–35)
MCHC RBC AUTO-ENTMCNC: 30 G/DL (ref 31–37)
MCV RBC AUTO: 92.3 FL (ref 80–100)
MONOCYTES # BLD AUTO: 0.5 K/UL (ref 0–1)
MONOCYTES NFR BLD AUTO: 1 %
NEUTROPHILS # BLD AUTO: 33.4 K/UL (ref 1.3–7.7)
NEUTROPHILS NFR BLD AUTO: 96 %
PLATELET # BLD AUTO: 294 K/UL (ref 150–450)
POTASSIUM SERPL-SCNC: 4.6 MMOL/L (ref 3.5–5.1)
PROT SERPL-MCNC: 5.6 G/DL (ref 6.3–8.2)
PT BLD: 11 SEC (ref 9–12)
SODIUM SERPL-SCNC: 134 MMOL/L (ref 137–145)
WBC # BLD AUTO: 34.7 K/UL (ref 3.8–10.6)

## 2020-10-05 PROCEDURE — 85025 COMPLETE CBC W/AUTO DIFF WBC: CPT

## 2020-10-05 PROCEDURE — 84484 ASSAY OF TROPONIN QUANT: CPT

## 2020-10-05 PROCEDURE — 85610 PROTHROMBIN TIME: CPT

## 2020-10-05 PROCEDURE — 96365 THER/PROPH/DIAG IV INF INIT: CPT

## 2020-10-05 PROCEDURE — 71046 X-RAY EXAM CHEST 2 VIEWS: CPT

## 2020-10-05 PROCEDURE — 83605 ASSAY OF LACTIC ACID: CPT

## 2020-10-05 PROCEDURE — 96376 TX/PRO/DX INJ SAME DRUG ADON: CPT

## 2020-10-05 PROCEDURE — 80053 COMPREHEN METABOLIC PANEL: CPT

## 2020-10-05 PROCEDURE — 99285 EMERGENCY DEPT VISIT HI MDM: CPT

## 2020-10-05 PROCEDURE — 36415 COLL VENOUS BLD VENIPUNCTURE: CPT

## 2020-10-05 PROCEDURE — 83880 ASSAY OF NATRIURETIC PEPTIDE: CPT

## 2020-10-05 PROCEDURE — 71275 CT ANGIOGRAPHY CHEST: CPT

## 2020-10-05 PROCEDURE — 83690 ASSAY OF LIPASE: CPT

## 2020-10-05 PROCEDURE — 87040 BLOOD CULTURE FOR BACTERIA: CPT

## 2020-10-05 PROCEDURE — 83735 ASSAY OF MAGNESIUM: CPT

## 2020-10-05 PROCEDURE — 85730 THROMBOPLASTIN TIME PARTIAL: CPT

## 2020-10-05 PROCEDURE — 93005 ELECTROCARDIOGRAM TRACING: CPT

## 2020-10-05 PROCEDURE — 96375 TX/PRO/DX INJ NEW DRUG ADDON: CPT

## 2020-10-05 NOTE — XR
EXAMINATION TYPE: XR chest 2V

 

DATE OF EXAM: 10/5/2020

 

COMPARISON: 9/21/2020

 

TECHNIQUE: PA and lateral views submitted.

 

HISTORY: Chest pain

 

FINDINGS:

There is bilateral lateral infiltrate and small effusion. Heart is enlarged and there is atherosclero
tic change aorta. Prominence of the hilum. Interstitial pattern noted. Diffuse osteopenia with postsu
rgical change left shoulder. No pneumothorax. Hypertrophic and degenerative change spine. Findings garner
ggest an aneurysm of the ascending aorta measuring approximately 4.4 cm.

 

IMPRESSION:

1. Bilateral infiltrate and pleural effusion with cardiomegaly correlate for mild CHF. Otherwise cons
ider pneumonia.

2. There is a aneurysm of the ascending aorta measuring approximately 4.4 cm

## 2020-10-05 NOTE — P.DS
Providers


Date of admission: 


10/05/20 07:55





Attending physician: 


Willy Correa





Consults: 





                                        





10/05/20 07:51


Consult Physician Urgent 


   Consulting Provider: Buddy Manuel


   Consult Reason/Comments: chest pain


   Do you want consulting provider notified?: Yes











Primary care physician: 


Jamee Herrera





[facility] Course: 


Please refer to HPI for further details





Patient Condition at Discharge: Serious





Plan - Discharge Summary


Discharge Rx Participant: No


New Discharge Prescriptions: 


No Action


   Atorvastatin [Lipitor] 20 mg PO HS


   Apixaban [Eliquis] 2.5 mg PO BID


   Citalopram Hydrobromide [CeleXA] 10 mg PO DAILY 30 Days #30 tab


   Nitrofurantoin Monohyd/M-Cryst [Macrobid] 100 mg PO Q12HR


   Mesalamine 4.8 gm PO DAILY@1200


   traMADol HCL [Ultram] 25 mg PO BID PRN


     PRN Reason: Pain


   Spironolactone [Aldactone] 25 mg PO DAILY #30 tab


   Losartan [Cozaar] 12.5 mg PO HS #30 tab


   Metoprolol Tartrate [Lopressor] 25 mg PO TID #90 tab


   Clopidogrel [Plavix] 75 mg PO DAILY #30 tab


   Aspirin 325 mg PO DAILY PRN


     PRN Reason: Pain


Discharge Medication List





Atorvastatin [Lipitor] 20 mg PO HS 01/22/15 [History]


Apixaban [Eliquis] 2.5 mg PO BID 04/26/17 [History]


Citalopram Hydrobromide [CeleXA] 10 mg PO DAILY 30 Days #30 tab 02/27/20 [Rx]


Mesalamine 4.8 gm PO DAILY@1200 09/21/20 [History]


Nitrofurantoin Monohyd/M-Cryst [Macrobid] 100 mg PO Q12HR 09/21/20 [History]


traMADol HCL [Ultram] 25 mg PO BID PRN 09/21/20 [History]


Clopidogrel [Plavix] 75 mg PO DAILY #30 tab 09/23/20 [Rx]


Losartan [Cozaar] 12.5 mg PO HS #30 tab 09/23/20 [Rx]


Metoprolol Tartrate [Lopressor] 25 mg PO TID #90 tab 09/23/20 [Rx]


Spironolactone [Aldactone] 25 mg PO DAILY #30 tab 09/23/20 [Rx]


Aspirin 325 mg PO DAILY PRN 10/05/20 [History]








Follow up Appointment(s)/Referral(s): 


Javier Mancuso MD [Primary Care Provider] - 1-2 days

## 2020-10-05 NOTE — P.HPIM
History of Present Illness


85-year-old female came in with compensative chest pain predominantly left side 

of the chest sharp in the severe back pain.  Patient had a CAT scan of the chest

which showed bilateral pedal pleural effusions and it was read as a small 

pulmonary emboli cannot be ruled out although patient clinical symptomology is 

not consistent with that and patient is on Eliquis patient has history of for 

myocardial infarction for couple weeks ago had the Serum troponin was 16 and at 

that time counseling her risk factors she was advised to follow medical therapy.

 Patient hasn't had EF of around 20%, with severe pulmonary hypertension.  Patie

nt prognosis overall is extremely poor.  Patient has minimally elevated troponin

of 0.1 her aunt during this hospital physician patient was also having diarrhea.

 Patient was started having pneumonia although pneumonia was not evident on x-

ray causing her diarrhea patient probably has C. diff C. diff was ordered.  

Patient was also in heart failure was started on IV Lasix.  Patient the clinical

symptomatology of chest pain he.  To be more mostly musculoskeletal.  Although 

patient was started on heparin and cardiology was consulted.  Shortly after 

arrival to ER patient did not feel well when to the bathroom felt nauseous 

shortly after that patient heart rate went down patient became nonresponsive but

patient is DO NOT RESUSCITATE her daughter is at the bedside and discussed with 

the daughter and the daughter doesn't want any further intervention as per 

patient's wishes.  As patient is DO NOT RESUSCITATE no further intervention was 

done patient initially was mildly bradycardic subsequently had pulseless 

electrical activity and then she subsequently .  Patient will also having

cough without any significant sputum production doesn't have any fever does have

a highly elevated white blood cell count on admission.








Review of Systems








REVIEW OF SYSTEMS: 


CONSTITUTIONAL: No fever, no malaise, no fatigue. 


HEENT: No recent visual problems or hearing problems. Denied any sore throat. 


CARDIOVASCULAR: No orthopnea, PND, no palpitations, no syncope. 


PULMONARY:  no hemoptysis. 


GASTROINTESTINAL: As mentioned in HPI


NEUROLOGICAL: No headaches, no weakness, no numbness. 


HEMATOLOGICAL: Denies any bleeding or petechiae. 


GENITOURINARY: Denies any burning micturition, frequency, or urgency. 


MUSCULOSKELETAL/RHEUMATOLOGICAL: Denies any joint pain, swelling, or any muscle 

pain. 


ENDOCRINE: Denies any polyuria or polydipsia. 





The rest of the 14-point review of systems is negative.











Past Medical History


Past Medical History: Coronary Artery Disease (CAD), Chest Pain / Angina, Heart 

Failure, CVA/TIA, GI Bleed, Hearing Disorder / Deafness, Hyperlipidemia, 

Hypertension, Myocardial Infarction (MI), Musculoskeletal Disorder, 

Osteoarthritis (OA), Vascular Disorder


Additional Past Medical History / Comment(s): Pt recently admtted to [facility] on 

20 with subacute MI (anteroseptal) and mild chf.    Other hx:  2015 CVA 

wtih slight poor vision/loss of L eye peripheral vision/difficulty judging 

distanc/some difficulty with understanding at times, PUD, ulcerative colitis, 

[place] bilaterally-pt unsure if caused by CVA, herniated discs, low back pain, 

recent cervical pain, UTIs, vertigo


Last Myocardial Infarction Date:: 20


History of Any Multi-Drug Resistant Organisms: None Reported


Past Surgical History: Appendectomy, Coronary Bypass/CABG, Heart 

Catheterization, Hernia Repair, Hysterectomy, Joint Replacement, Tonsillectomy


Additional Past Surgical History / Comment(s): [facility]ADOR, 4 vessel CABG in , L 

caratid endartectomy, bilateral tents common iliac arteries, EGD, colonoscopy/be

nign rectal polyp, L total shoulder, bilateral cataract removals/lens implants, 

incisional hernia repair


Past Anesthesia/Blood Transfusion Reactions: No Reported Reaction


Smoking Status: Former smoker





- Past Family History


  ** Sister(s)


Family Medical History: Cancer





  ** Father


Family Medical History: No Reported History


Additional Family Medical History / Comment(s): Father was healthy and lived to 

be 87yrs old.





  ** Mother


Family Medical History: Diabetes Mellitus


Additional Family Medical History / Comment(s): Mother  at the age of 61 

yrs.





Medications and Allergies


                                Home Medications











 Medication  Instructions  Recorded  Confirmed  Type


 


Atorvastatin [Lipitor] 20 mg PO HS 01/22/15 10/05/20 History


 


Apixaban [Eliquis] 2.5 mg PO BID 04/26/17 10/05/20 History


 


Citalopram Hydrobromide [CeleXA] 10 mg PO DAILY 30 Days #30 tab 02/27/20 10/

05/20 Rx


 


Mesalamine 4.8 gm PO DAILY@1200 09/21/20 10/05/20 History


 


Nitrofurantoin Monohyd/M-Cryst 100 mg PO Q12HR 09/21/20 10/05/20 History





[Macrobid]    


 


traMADol HCL [Ultram] 25 mg PO BID PRN 09/21/20 10/05/20 History


 


Clopidogrel [Plavix] 75 mg PO DAILY #30 tab 09/23/20 10/05/20 Rx


 


Losartan [Cozaar] 12.5 mg PO HS #30 tab 09/23/20 10/05/20 Rx


 


Metoprolol Tartrate [Lopressor] 25 mg PO TID #90 tab 09/23/20 10/05/20 Rx


 


Spironolactone [Aldactone] 25 mg PO DAILY #30 tab 09/23/20 10/05/20 Rx


 


Aspirin 325 mg PO DAILY PRN 10/05/20 10/05/20 History








                                    Allergies











Allergy/AdvReac Type Severity Reaction Status Date / Time


 


adhesive Allergy Mild Rash/Hives Verified 10/05/20 07:59


 


ciprofloxacin HCl Allergy Mild Rash/Hives Verified 10/05/20 07:59





[From Cipro]     














Physical Exam


Vitals: 


                                   Vital Signs











  Temp Pulse Pulse Resp BP BP Pulse Ox


 


 10/05/20 09:00  98.3 F  108 H   20  112/76   96


 


 10/05/20 08:58  97.7 F   99  16   111/48  97


 


 10/05/20 08:02   112 H   20  109/74   96


 


 10/05/20 06:07  98.3 F  130 H   26 H  122/82   96








                                Intake and Output











 10/04/20 10/05/20 10/05/20





 22:59 06:59 14:59


 


Other:   


 


  Weight  70.307 kg 70.307 kg











This examination as before her expiration





PHYSICAL EXAMINATION: 





GENERAL: The patient is alert and oriented x3, mild respiratory distress. Well 

developed, well nourished. 


HEENT: Pupils are round and equally reacting to light. EOMI. No scleral icterus.

No conjunctival pallor. Normocephalic, atraumatic. No pharyngeal erythema. No 

thyromegaly. 


CARDIOVASCULAR: S1 and S2 present. No murmurs, rubs, or gallops.  Diffuse of 

accessory muscles of breathing I was unable to assess JVD clearly


PULMONARY: Diminished air entry patient doesn't take a deep breath. 


ABDOMEN: Soft, nontender, nondistended, normoactive bowel sounds. No palpable 

organomegaly. 


MUSCULOSKELETAL: No joint swelling or deformity.


EXTREMITIES: No cyanosis, clubbing, very minimal pedal edema


NEUROLOGICAL: Gross neurological examination did not reveal any focal deficits. 


SKIN: No rashes. 

















Results


CBC & Chem 7: 


                                 10/05/20 06:40





                                 10/05/20 06:40


Labs: 


                  Abnormal Lab Results - Last 24 Hours (Table)











  10/05/20 10/05/20 10/05/20 Range/Units





  06:40 06:40 06:40 


 


WBC  34.7 H    (3.8-10.6)  k/uL


 


MCHC  30.0 L    (31.0-37.0)  g/dL


 


Neutrophils #  33.4 H    (1.3-7.7)  k/uL


 


Lymphocytes #  0.2 L    (1.0-4.8)  k/uL


 


Sodium   134 L   (137-145)  mmol/L


 


Carbon Dioxide   20 L   (22-30)  mmol/L


 


BUN   21 H   (7-17)  mg/dL


 


Creatinine   1.14 H   (0.52-1.04)  mg/dL


 


Glucose   169 H   (74-99)  mg/dL


 


Plasma Lactic Acid Bi     (0.7-2.0)  mmol/L


 


Calcium   8.2 L   (8.4-10.2)  mg/dL


 


Troponin I    0.107 H*  (0.000-0.034)  ng/mL


 


Total Protein   5.6 L   (6.3-8.2)  g/dL


 


Albumin   3.1 L   (3.5-5.0)  g/dL


 


Lipase   15 L   ()  U/L














  10/05/20 10/05/20 Range/Units





  08:00 09:54 


 


WBC    (3.8-10.6)  k/uL


 


MCHC    (31.0-37.0)  g/dL


 


Neutrophils #    (1.3-7.7)  k/uL


 


Lymphocytes #    (1.0-4.8)  k/uL


 


Sodium    (137-145)  mmol/L


 


Carbon Dioxide    (22-30)  mmol/L


 


BUN    (7-17)  mg/dL


 


Creatinine    (0.52-1.04)  mg/dL


 


Glucose    (74-99)  mg/dL


 


Plasma Lactic Acid Bi  2.2 H*   (0.7-2.0)  mmol/L


 


Calcium    (8.4-10.2)  mg/dL


 


Troponin I   0.121 H*  (0.000-0.034)  ng/mL


 


Total Protein    (6.3-8.2)  g/dL


 


Albumin    (3.5-5.0)  g/dL


 


Lipase    ()  U/L














Thrombosis Risk Factor Assmnt





- Choose All That Apply


Any of the Below Risk Factors Present?: Yes


Each Factor Represents 1 point: Heart failure (<1month), Obesity (BMI >25), 

Serious lung disease incl. pneumonia (< 1month)


Other Risk Factors: Yes


Each Risk Factor Represents 3 Points: Age 75 years or older


Other congenital or acquired thrombophilia - If yes, enter type in comment: No


Thrombosis Risk Factor Assessment Total Risk Factor Score: 6


Thrombosis Risk Factor Assessment Level: High Risk





Assessment and Plan


Plan: 


-Chest pain with the possibility of non-ST elevation microinfarction patient 

clinically appeared to have musculoskeletal chest pain later may have had 

myocardial infarctionssubsequently  patient was on IV heparin before the 

for that.


-Congestive heart failure chronic systolic dysfunction with acute exacerbation 

of around 25%


-Diarrhea possibility of cystoscopy deficit colitis


-Bilateral pleural effusions secondary to CHF


-Atrial fibrillation on anticoagulation


-Low possibility of PE clinically


-Severe pulmonary hypertension


-Severe coronary artery disease with a possible acute myocardial infarction


-Hyperlipidemia


Hypertension


-Peripheral vascular disease


-Patient is DO NOT RESUSCITATE


Lactic acidosis: Probably secondary to C. diff colitis





Patient overall prognosis is extremely poor and I agreed with the family the 

patient shouldn't be resuscitated.  Patient subsequently became unresponsive, 

mildly bradycardic and pulseless electrical activity and subsequently .  

Patient probably had another myocardial infarction.  Patient  on fifth of

2020 at 10:55 AM

## 2020-10-05 NOTE — CT
EXAMINATION TYPE: CT angio chest

 

DATE OF EXAM: 10/5/2020

 

COMPARISON: [facility]

 

HISTORY: Shortness of breath and weakness.

 

CT DLP: 315.3 mGycm

 

CONTRAST: 

CT chest with contrast and 3D reconstruction with MIP imaging is performed with IV Contrast, patient 
injected with 65 mL of Isovue 370.

 

Contrast-enhanced CT of the chest was performed through the course of the pulmonary arteries with reba
g and mediastinal window settings submitted.  3D reconstruction with MIP imaging was also performed.

 

PULMONARY ARTERIES: There is a filling defect within the left lower lobe pulmonary arterial branch to
 7 of 133. Pulmonary embolism is difficult to exclude. No additional filling defects seen. No evidenc
e for saddle embolus.

 

LUNGS: Bilateral pleural effusions are noted measuring approximately 5.7 cm on the left AP dimension 
and approximately 2.8 cm on the right. There is left lower lobe atelectasis and/or infiltrate.

 

MEDIASTINUM: Atheromatous and ectatic change noted of the thoracic aorta. No evidence for aneurysm. T
he heart is  enlarged.  No evidence for mediastinal mass.  No mediastinal lymph nodes greater than 1c
m.

 

HILAR STRUCTURES: No evidence for mass.  No hilar lymph nodes greater than 1 cm.

 

UPPER ABDOMEN:  No significant abnormality is seen.

 

IMPRESSION:

1.  Left lower lobe pulmonary embolism is difficult to exclude.

2. Cardiomegaly with bilateral pleural effusions and left lower lobe atelectasis and/or infiltrate.

## 2020-10-07 NOTE — CDI
Documentation Clarification Form



Date: 10/07/2020 10:54:00 AM

From: Natalie Dallas

Phone: If you have a question about this query, please contact Cherry Alves 
 at [phone] between 8am and 5pm.

MRN: [id number]

Admit Date: 10/05/2020 07:55:00 AM

Patient Name: Lynne Tripathi

Visit Number: [id number]

Discharge Date:  10/05/2020 01:20:00 PM





ATTENTION: The Clinical Documentation Specialists (CDI) and [facility] Coding Staff 
appreciate your assistance in clarifying documentation. Please respond to the 
clarification below the line at the bottom and electronically sign. The CDI & 
[facility] Coding staff will review the response and follow-up if needed. Please note: 
Queries are made part of the Legal Health Record. If you have any questions, 
please contact the author of this message via ITS.



Dr. Asha Pinto







Atrial Fibrillation is documented in the H an P and patient on anticoagulation. 
Please clarify type of atrial fibrillation.



History/Risk Factors: Anterior MI within four weeks, current possible NSTEMI, 
CHF exacerbation, CAD with CABG

EKG/telemetry: Sinus Tachycardia  HR on admission 130 BPM

Treatment:  home anticoagulation

Consults:  



In your professional opinion, can you please clarify the type of Atrial 
Fibrillation, if known?  



Chronic/Permanent

Paroxysmal

Persistent

Other, please specify ________

Unable to determine





___________________________________________________________________________



Unable to determine

MTDD

## 2020-10-07 NOTE — CDI
Documentation Clarification Form



Date: 10/07/2020 11:06:00 AM

From: Natalie Dallas

Phone: If you have a question about this query, please contact Cherry Alves 
 at [phone] between 8am and 5pm.

MRN: [id number]

Admit Date: 10/05/2020 07:55:00 AM

Patient Name: Lynne Tripathi

Visit Number: [id number]

Discharge Date:  10/05/2020 01:20:00 PM



ATTENTION: The Clinical Documentation Specialists (CDI) and [facility] Coding Staff 
appreciate your assistance in clarifying documentation. Please respond to the 
clarification below the line at the bottom and electronically sign. The CDI & 
[facility] Coding staff will review the response and follow-up if needed. Please note: 
Queries are made part of the Legal Health Record. If you have any questions, 
please contact the author of this message via ITS.



Dr. Asha Pinto



Pneumonia was documented in ED notes.  H and P confusion documentation regarding
pneumonia:  "Patient was started having pneumonia although pneumonia not evident
on CXR causing her diarrhea patient probably has C. Diff."  Please clarify if 
patient had pneumonia or was it ruled out.



History/Risk Factors:  NSTEMI, CHF, hx. of pneumonia

Vital signs:  98.3 F,     130 BPM,    26,  122/82,    96% 3NC

WBC/Left shift:   34.7

X-ray:  bilateral infiltrate

Treatment:  Rocephin, Zosyn, Azithromycin for concerns of pseudomonas infection

Antibiotics    Rocephiin, Zoayn, Azithromycin

O2  3 NC



In order to capture the severity of condition, please clarify if patient had 
pneumonia or was it ruled out.



Pneumonia

Pneumonia ruled out   

 

        Bacterial Pneumonia, specify causal organism (if known)

    Gram Negative Pneumonia

    Due to Strep

?Due to Staph

?Due to E. Coli

?Other bacteria (please specify)______

Viral Pneumonia, specify casual organism (if known) _______

Healthcare Acquired Pneumonia/Pneumonia, unspecified

Other, please specify _______

Unable to determine





___________________________________________________________________________

Unable to determine

MTDD

## 2020-10-07 NOTE — CDI
Documentation Clarification Form



Date: 10/07/2020 11:30:00 AM

From: Natalie Dallas

Phone: If you have a question about this query, please contact Cherry Alves, 
 at [phone] between 8am and 5pm.

MRN: [id number]

Admit Date: 10/05/2020 07:55:00 AM

Patient Name: Lynne Tripathi

Visit Number: [id number]

Discharge Date:  10/05/2020 01:20:00 PM





ATTENTION: The Clinical Documentation Specialists (CDI) and [facility] Coding Staff 
appreciate your assistance in clarifying documentation. Please respond to the 
clarification below the line at the bottom and electronically sign. The CDI & 
[facility] Coding staff will review the response and follow-up if needed. Please note: 
Queries are made part of the Legal Health Record. If you have any questions, 
please contact the author of this message via ITS.



Dr. Asha Pinto







Per ED note "Radiologist stated he was difficult to exclude possibility of PE in
the left lower, she is currently anticoagulated on Eliquis and will be continued
on Eliquis."   Per H and P CT scan of the chest showed bilateral pleural 
effusions and it was read as a small pulmonary emboli cannot be ruled out 
although patient's symptomology not consistent with PE,  Per H and P Plan:  Low 
possibility of PE clinically.  Please clarify if PE was ruled out or did in your
clinical opinion did patient have a PE.



History/Risk Factors:  Sharp Chest pain and severe back pain.

Radiology findings:  Left lower lobe PE is difficult to exclude

Vital Signs:  98.3 F,    130 bpm,     26,    122/82,    96 3NC

Treatment:  Patient on Eliquis and Plavix - home meds





In your professional opinion, can you please clarify if PE was ruled out or was 
PE a possibility or not excluded.



PE

PE ruled out

   Other, please specify ________

   Unable to determine







___________________________________________________________________________

No PE

MTDD

## 2024-04-09 NOTE — P.PN
Subjective


Progress Note Date: 02/26/20


Principal diagnosis: 





84-year-old pleasant female was brought in with multiple nonspecific complaints 

as per the daughter patient was having an throat pain patient also comparing of 

shoulder pain back pain and chest pain.  Patient was extensively evaluated 

during his recent hospital and patient is a concurrent syndromes.  Patient chest

pain is musculoskeletal nature appears to be coming from the back constant pain 

patient appears to be mostly depressed and she believes her daughter will be 

 and doesn't want to live anymore 1 the doctors to give her something so 

that she will diet patient admits to being severely depressed, she would kill 

herself if he she knows how to do it means to do it.  Her main issues appears to

be mainly depression age-related.  Patient has good memory patient and family 

wanted the to be comfort care and hospice only although patient is obviously 

depressed.  Patient is bit dehydrated probably secondary to posttraumatic I'm 

holding off on the ACE inhibitor and patient will be started on IV fluids, 

psychiatric will be consulted along with one-on-one sitter.





02/26/2020


Patient is sitting up in the chair currently without one-on-one sitter awaiting 

psychiatric consult at this time.  Patient states that she is having a somewhat 

better day and has been eating and drinking a little more.  Patient continues to

be depressed and have sadness denies any suicidal ideation at this time.  

Patient states that she lives with her daughter and will be returning there once

discharged.  Creatinine has improved today and is currently 0.87.  Patient is 

maintained on normal saline at 100 mL per hour.  No reports of chest pain, 

shortness of breath, or palpitations.  Patient is afebrile.  No reports of 

nausea or vomiting and patient is tolerating diet. 








Objective





- Vital Signs


Vital signs: 


                                   Vital Signs











Temp  97.9 F   02/26/20 14:31


 


Pulse  60   02/26/20 14:31


 


Resp  16   02/26/20 14:31


 


BP  136/62   02/26/20 14:31


 


Pulse Ox  95   02/26/20 14:31








                                 Intake & Output











 02/25/20 02/26/20 02/26/20





 18:59 06:59 18:59


 


Intake Total 240 300 


 


Balance 240 300 


 


Weight 70.307 kg  


 


Intake:   


 


  Oral 240 300 


 


Other:   


 


  Voiding Method  Toilet Toilet





  Bedside Commode Bedside Commode





  Incontinent Incontinent


 


  # Voids 1 1 2














- Exam





GENERAL: The patient is alert and oriented x3, not in any acute distress. Well 

developed, well nourished.  She is obviously depressed although is in a little 

better spirits today.  Patient has been eating and drinking a little more than 

normal


HEENT: Pupils are round and equally reacting to light. EOMI. No scleral icterus.

No conjunctival pallor. Normocephalic, atraumatic. No pharyngeal erythema. No 

thyromegaly. 


CARDIOVASCULAR: S1 and S2 present. No murmurs, rubs, or gallops. 


PULMONARY: Chest is clear to auscultation, no wheezing or crackles. 


ABDOMEN: Soft, nontender, nondistended, normoactive bowel sounds. No palpable 

organomegaly. 


MUSCULOSKELETAL: No joint swelling or deformity.


EXTREMITIES: No cyanosis, clubbing, or pedal edema. 


NEUROLOGICAL: Gross neurological examination did not reveal any focal deficits. 


SKIN: No rashes. 








- Labs


CBC & Chem 7: 


                                 02/24/20 16:13





                                 02/26/20 08:14


Labs: 


                  Abnormal Lab Results - Last 24 Hours (Table)











  02/26/20 Range/Units





  08:14 


 


Sodium  136 L  (137-145)  mmol/L


 


Chloride  108 H  ()  mmol/L


 


BUN  21 H  (7-17)  mg/dL


 


Glucose  124 H  (74-99)  mg/dL


 


Calcium  7.8 L  (8.4-10.2)  mg/dL














Assessment and Plan


Assessment: 





-Acute renal failure probably secondary to poor  oral intake.  Patient 

creatinine improved today and is currently 0.87 and is maintained on normal 

saline at 100 mL per hour.  Encouraged oral intake and patient has been drinking

more water today per staff


-Chest pain musculoskeletal secondary to severe osteoarthritis and back pain 

which is radiating to her chest and part of her pain complaints may be related 

to her depression to.  Repeat troponins have been trending down


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 

severely depressed and psychiatry was consulted and is pending.


-Leukocytosis reactive no evidence of urinary tract infection or any other in

fection


-CVAT in the past patient is on Eliquis I believe is secondary to atrial 

fibrillation which will be resumed patient is sinus rhythm rate controlled at 

this time


-Hyperlipidemia


-Hypertension





Plan:


Continue current medications and symptomatic treatment.  Awaiting psychiatric 

consult.  Spoke to the patient about discharge planning and she states that she 

will be returning home with her daughter as she lives with her once discharged. 

Case management and social work are aware.  Further recommendations to follow.  

Possible discharge in 24 hours. breathing is unlabored without accessory muscle use., normal breath sounds.